# Patient Record
Sex: FEMALE | Race: WHITE | NOT HISPANIC OR LATINO | Employment: UNEMPLOYED | URBAN - METROPOLITAN AREA
[De-identification: names, ages, dates, MRNs, and addresses within clinical notes are randomized per-mention and may not be internally consistent; named-entity substitution may affect disease eponyms.]

---

## 2017-11-14 PROBLEM — O28.1: Status: ACTIVE | Noted: 2017-11-14

## 2017-11-14 PROBLEM — O28.0 ABNORMAL MSAFP (MATERNAL SERUM ALPHA-FETOPROTEIN), ELEVATED: Status: ACTIVE | Noted: 2017-11-14

## 2018-02-13 PROBLEM — O28.0 ABNORMAL QUAD SCREEN: Status: ACTIVE | Noted: 2018-02-13

## 2018-02-16 PROBLEM — Z87.898 HISTORY OF POOR FETAL GROWTH: Status: ACTIVE | Noted: 2018-02-16

## 2018-02-23 PROBLEM — O36.5990 ASYMMETRIC INTRAUTERINE GROWTH RESTRICTION AFFECTING PREGNANCY, ANTEPARTUM: Status: ACTIVE | Noted: 2018-02-23

## 2018-02-26 PROBLEM — O28.8 NST (NON-STRESS TEST) NONREACTIVE: Status: ACTIVE | Noted: 2018-02-26

## 2018-03-02 PROBLEM — O36.5990 INTRAUTERINE GROWTH RESTRICTION (IUGR) AFFECTING CARE OF MOTHER: Status: ACTIVE | Noted: 2018-03-02

## 2018-03-26 PROBLEM — O36.5990 INTRAUTERINE GROWTH RESTRICTION, ANTEPARTUM: Status: ACTIVE | Noted: 2018-03-26

## 2018-03-26 PROBLEM — O99.013 ANEMIA AFFECTING PREGNANCY IN THIRD TRIMESTER: Status: ACTIVE | Noted: 2018-03-26

## 2018-03-26 PROBLEM — O99.820 GROUP B STREPTOCOCCAL CARRIAGE COMPLICATING PREGNANCY: Status: ACTIVE | Noted: 2018-03-26

## 2019-01-21 PROBLEM — O28.0 ABNORMAL QUAD SCREEN: Status: RESOLVED | Noted: 2018-02-13 | Resolved: 2019-01-21

## 2019-03-04 PROBLEM — O28.0 ABNORMAL MSAFP (MATERNAL SERUM ALPHA-FETOPROTEIN), ELEVATED: Status: RESOLVED | Noted: 2017-11-14 | Resolved: 2019-03-04

## 2019-09-21 ENCOUNTER — OFFICE VISIT (OUTPATIENT)
Dept: URGENT CARE | Facility: CLINIC | Age: 29
End: 2019-09-21
Payer: COMMERCIAL

## 2019-09-21 VITALS
WEIGHT: 110 LBS | RESPIRATION RATE: 18 BRPM | BODY MASS INDEX: 19.49 KG/M2 | DIASTOLIC BLOOD PRESSURE: 70 MMHG | HEART RATE: 100 BPM | OXYGEN SATURATION: 100 % | HEIGHT: 63 IN | SYSTOLIC BLOOD PRESSURE: 101 MMHG | TEMPERATURE: 99 F

## 2019-09-21 DIAGNOSIS — A08.4 VIRAL GASTROENTERITIS: Primary | ICD-10-CM

## 2019-09-21 PROCEDURE — 3008F PR BODY MASS INDEX (BMI) DOCUMENTED: ICD-10-PCS | Mod: CPTII,S$GLB,, | Performed by: PHYSICIAN ASSISTANT

## 2019-09-21 PROCEDURE — S0119 PR ONDANSETRON, ORAL, 4MG: ICD-10-PCS | Mod: S$GLB,,, | Performed by: PHYSICIAN ASSISTANT

## 2019-09-21 PROCEDURE — 99203 OFFICE O/P NEW LOW 30 MIN: CPT | Mod: S$GLB,,, | Performed by: PHYSICIAN ASSISTANT

## 2019-09-21 PROCEDURE — 3008F BODY MASS INDEX DOCD: CPT | Mod: CPTII,S$GLB,, | Performed by: PHYSICIAN ASSISTANT

## 2019-09-21 PROCEDURE — 99203 PR OFFICE/OUTPT VISIT, NEW, LEVL III, 30-44 MIN: ICD-10-PCS | Mod: S$GLB,,, | Performed by: PHYSICIAN ASSISTANT

## 2019-09-21 PROCEDURE — S0119 ONDANSETRON 4 MG: HCPCS | Mod: S$GLB,,, | Performed by: PHYSICIAN ASSISTANT

## 2019-09-21 RX ORDER — ONDANSETRON 4 MG/1
4 TABLET, ORALLY DISINTEGRATING ORAL
Status: COMPLETED | OUTPATIENT
Start: 2019-09-21 | End: 2019-09-21

## 2019-09-21 RX ORDER — ONDANSETRON 4 MG/1
4 TABLET, FILM COATED ORAL EVERY 8 HOURS PRN
Qty: 30 TABLET | Refills: 0 | Status: ON HOLD | OUTPATIENT
Start: 2019-09-21 | End: 2020-03-09 | Stop reason: CLARIF

## 2019-09-21 RX ADMIN — ONDANSETRON 4 MG: 4 TABLET, ORALLY DISINTEGRATING ORAL at 11:09

## 2019-09-21 NOTE — PROGRESS NOTES
"Subjective:       Patient ID: Fiona Timmons is a 29 y.o. female.    Vitals:  height is 5' 3" (1.6 m) and weight is 49.9 kg (110 lb). Her oral temperature is 98.5 °F (36.9 °C). Her blood pressure is 101/70 and her pulse is 100. Her respiration is 18 and oxygen saturation is 100%.     Chief Complaint: Nausea    Patient woke @  2 am with nausea and 3 am started vomiting . Patient is consistently vomited every 2 hours since then. Patient's daughter had a stomach virus this past Wednesday and patient is 13 weeks pregnant.    Nausea   This is a new problem. The current episode started today. The problem occurs constantly. The problem has been gradually worsening. Associated symptoms include headaches, nausea and vomiting. Pertinent negatives include no abdominal pain, chills, fever or rash.       Constitution: Negative for chills and fever.   Neck: Negative for painful lymph nodes.   Gastrointestinal: Positive for nausea and vomiting. Negative for abdominal pain, constipation and diarrhea.   Genitourinary: Positive for pelvic pain. Negative for dysuria, frequency, urgency, urine decreased, hematuria, history of kidney stones, painful menstruation, heavy menstrual bleeding, ovarian cysts, genital trauma, vaginal pain, vaginal discharge, vaginal bleeding, vaginal odor, painful intercourse, genital sore and painful ejaculation.   Musculoskeletal: Positive for back pain.   Skin: Negative for rash and lesion.   Neurological: Positive for headaches.   Hematologic/Lymphatic: Negative for swollen lymph nodes.       Objective:      Physical Exam   Constitutional: She is oriented to person, place, and time. She appears well-developed and well-nourished.   HENT:   Head: Normocephalic and atraumatic.   Right Ear: External ear normal.   Left Ear: External ear normal.   Nose: Nose normal.   Mouth/Throat: Mucous membranes are normal.   Eyes: Conjunctivae and lids are normal.   Neck: Trachea normal and full passive range of motion " without pain. Neck supple.   Cardiovascular: Normal rate, regular rhythm and normal heart sounds.   Pulmonary/Chest: Effort normal and breath sounds normal. No respiratory distress.   Abdominal: Soft. Normal appearance and bowel sounds are normal. She exhibits no distension, no abdominal bruit, no pulsatile midline mass and no mass. There is tenderness (Mild) in the suprapubic area.   Musculoskeletal: Normal range of motion. She exhibits no edema.   Neurological: She is alert and oriented to person, place, and time. She has normal strength.   Skin: Skin is warm, dry and intact. She is not diaphoretic. No pallor.   Psychiatric: She has a normal mood and affect. Her speech is normal and behavior is normal. Judgment and thought content normal. Cognition and memory are normal.   Nursing note and vitals reviewed.      Assessment:       1. Viral gastroenteritis        Plan:         Viral gastroenteritis    Other orders  -     ondansetron disintegrating tablet 4 mg  -     ondansetron (ZOFRAN) 4 MG tablet; Take 1 tablet (4 mg total) by mouth every 8 (eight) hours as needed.  Dispense: 30 tablet; Refill: 0     Viral gastroenteritis.  Likely the same.  She denies any vaginal bleeding.  Denies any hemoptysis.  Will treat with Zofran.  Given strict precautions to go to ED with any worsening pain, vaginal bleeding or no response to Zofran.  Advised low residue diet and then progressed diet.  Advised water and Pedialyte for rehydration.  Patient states understanding and agrees with plan.      You must understand that you've received an Urgent Care treatment only and that you may be released before all your medical problems are known or treated. You, the patient, will arrange for follow up care as instructed.  Follow up with your PCP or specialty clinic as directed in the next 1-2 weeks if not improved or as needed.  You can call [phone] to schedule an appointment with the appropriate provider.  If your condition worsens we  recommend that you receive another evaluation at the emergency room immediately or contact your primary medical clinics after hours call service to discuss your concerns.  Please return here or go to the Emergency Department for any concerns or worsening of condition.

## 2019-09-21 NOTE — PROGRESS NOTES
Subjective:       Patient ID: Fiona Timmons is a 29 y.o. female.    Vitals:  vitals were not taken for this visit.     Chief Complaint: Nausea    Nausea   Associated symptoms include nausea.       Gastrointestinal: Positive for nausea.       Objective:      Physical Exam    Assessment:       No diagnosis found.    Plan:         There are no diagnoses linked to this encounter.

## 2019-09-21 NOTE — PATIENT INSTRUCTIONS

## 2019-09-24 ENCOUNTER — TELEPHONE (OUTPATIENT)
Dept: URGENT CARE | Facility: CLINIC | Age: 29
End: 2019-09-24

## 2020-02-06 PROBLEM — O21.9 NAUSEA AND VOMITING IN PREGNANCY: Status: ACTIVE | Noted: 2020-02-06

## 2020-03-09 PROBLEM — Z34.90 ENCOUNTER FOR INDUCTION OF LABOR: Status: ACTIVE | Noted: 2020-03-09

## 2020-10-08 ENCOUNTER — TELEPHONE (OUTPATIENT)
Dept: ADMINISTRATIVE | Facility: CLINIC | Age: 30
End: 2020-10-08

## 2021-04-28 ENCOUNTER — OFFICE VISIT (OUTPATIENT)
Dept: URGENT CARE | Facility: CLINIC | Age: 31
End: 2021-04-28
Payer: COMMERCIAL

## 2021-04-28 VITALS
BODY MASS INDEX: 23.74 KG/M2 | WEIGHT: 134 LBS | SYSTOLIC BLOOD PRESSURE: 111 MMHG | TEMPERATURE: 99 F | HEIGHT: 63 IN | DIASTOLIC BLOOD PRESSURE: 73 MMHG | HEART RATE: 97 BPM | OXYGEN SATURATION: 100 % | RESPIRATION RATE: 18 BRPM

## 2021-04-28 DIAGNOSIS — R50.9 FEVER, UNSPECIFIED FEVER CAUSE: Primary | ICD-10-CM

## 2021-04-28 DIAGNOSIS — N61.0 MASTITIS: ICD-10-CM

## 2021-04-28 DIAGNOSIS — R39.15 URINARY URGENCY: ICD-10-CM

## 2021-04-28 DIAGNOSIS — R30.0 DYSURIA: ICD-10-CM

## 2021-04-28 LAB
BILIRUB UR QL STRIP: NEGATIVE
GLUCOSE UR QL STRIP: NEGATIVE
KETONES UR QL STRIP: NEGATIVE
LEUKOCYTE ESTERASE UR QL STRIP: NEGATIVE
PH, POC UA: 5 (ref 5–8)
POC BLOOD, URINE: NEGATIVE
POC NITRATES, URINE: NEGATIVE
PROT UR QL STRIP: NEGATIVE
SP GR UR STRIP: 1 (ref 1–1.03)
UROBILINOGEN UR STRIP-ACNC: NORMAL (ref 0.1–1.1)

## 2021-04-28 PROCEDURE — 99214 OFFICE O/P EST MOD 30 MIN: CPT | Mod: 25,S$GLB,, | Performed by: PHYSICIAN ASSISTANT

## 2021-04-28 PROCEDURE — 1125F AMNT PAIN NOTED PAIN PRSNT: CPT | Mod: S$GLB,,, | Performed by: PHYSICIAN ASSISTANT

## 2021-04-28 PROCEDURE — 81003 POCT URINALYSIS, DIPSTICK, AUTOMATED, W/O SCOPE: ICD-10-PCS | Mod: QW,S$GLB,, | Performed by: PHYSICIAN ASSISTANT

## 2021-04-28 PROCEDURE — 87086 CULTURE, URINE: ICD-10-PCS | Mod: S$GLB,,, | Performed by: PHYSICIAN ASSISTANT

## 2021-04-28 PROCEDURE — 3008F PR BODY MASS INDEX (BMI) DOCUMENTED: ICD-10-PCS | Mod: CPTII,S$GLB,, | Performed by: PHYSICIAN ASSISTANT

## 2021-04-28 PROCEDURE — 81003 URINALYSIS AUTO W/O SCOPE: CPT | Mod: QW,S$GLB,, | Performed by: PHYSICIAN ASSISTANT

## 2021-04-28 PROCEDURE — 87086 URINE CULTURE/COLONY COUNT: CPT | Mod: S$GLB,,, | Performed by: PHYSICIAN ASSISTANT

## 2021-04-28 PROCEDURE — 1125F PR PAIN SEVERITY QUANTIFIED, PAIN PRESENT: ICD-10-PCS | Mod: S$GLB,,, | Performed by: PHYSICIAN ASSISTANT

## 2021-04-28 PROCEDURE — 99214 PR OFFICE/OUTPT VISIT, EST, LEVL IV, 30-39 MIN: ICD-10-PCS | Mod: 25,S$GLB,, | Performed by: PHYSICIAN ASSISTANT

## 2021-04-28 PROCEDURE — 3008F BODY MASS INDEX DOCD: CPT | Mod: CPTII,S$GLB,, | Performed by: PHYSICIAN ASSISTANT

## 2021-04-28 RX ORDER — CEPHALEXIN 500 MG/1
500 CAPSULE ORAL 4 TIMES DAILY
Qty: 28 CAPSULE | Refills: 0 | Status: SHIPPED | OUTPATIENT
Start: 2021-04-28 | End: 2021-05-03

## 2021-05-02 ENCOUNTER — TELEPHONE (OUTPATIENT)
Dept: URGENT CARE | Facility: CLINIC | Age: 31
End: 2021-05-02

## 2021-05-02 LAB
BACTERIA UR CULT: NORMAL
BACTERIA UR CULT: NORMAL

## 2021-05-03 ENCOUNTER — OFFICE VISIT (OUTPATIENT)
Dept: FAMILY MEDICINE | Facility: CLINIC | Age: 31
End: 2021-05-03
Payer: COMMERCIAL

## 2021-05-03 VITALS
OXYGEN SATURATION: 99 % | SYSTOLIC BLOOD PRESSURE: 112 MMHG | BODY MASS INDEX: 20.43 KG/M2 | DIASTOLIC BLOOD PRESSURE: 78 MMHG | HEART RATE: 113 BPM | HEIGHT: 63 IN | WEIGHT: 115.31 LBS

## 2021-05-03 DIAGNOSIS — G43.119 INTRACTABLE MIGRAINE WITH AURA WITHOUT STATUS MIGRAINOSUS: Primary | ICD-10-CM

## 2021-05-03 PROBLEM — Z34.90 ENCOUNTER FOR INDUCTION OF LABOR: Status: RESOLVED | Noted: 2020-03-09 | Resolved: 2021-05-03

## 2021-05-03 PROCEDURE — 99204 PR OFFICE/OUTPT VISIT, NEW, LEVL IV, 45-59 MIN: ICD-10-PCS | Mod: S$GLB,,, | Performed by: PHYSICIAN ASSISTANT

## 2021-05-03 PROCEDURE — 99999 PR PBB SHADOW E&M-EST. PATIENT-LVL III: CPT | Mod: PBBFAC,,, | Performed by: PHYSICIAN ASSISTANT

## 2021-05-03 PROCEDURE — 99204 OFFICE O/P NEW MOD 45 MIN: CPT | Mod: S$GLB,,, | Performed by: PHYSICIAN ASSISTANT

## 2021-05-03 PROCEDURE — 99999 PR PBB SHADOW E&M-EST. PATIENT-LVL III: ICD-10-PCS | Mod: PBBFAC,,, | Performed by: PHYSICIAN ASSISTANT

## 2022-05-22 ENCOUNTER — OFFICE VISIT (OUTPATIENT)
Dept: URGENT CARE | Facility: CLINIC | Age: 32
End: 2022-05-22
Payer: COMMERCIAL

## 2022-05-22 VITALS
RESPIRATION RATE: 19 BRPM | DIASTOLIC BLOOD PRESSURE: 68 MMHG | TEMPERATURE: 98 F | HEART RATE: 83 BPM | BODY MASS INDEX: 20.38 KG/M2 | HEIGHT: 63 IN | SYSTOLIC BLOOD PRESSURE: 104 MMHG | WEIGHT: 115 LBS | OXYGEN SATURATION: 96 %

## 2022-05-22 DIAGNOSIS — L30.9 ECZEMA, UNSPECIFIED TYPE: ICD-10-CM

## 2022-05-22 DIAGNOSIS — R11.2 NAUSEA AND VOMITING, INTRACTABILITY OF VOMITING NOT SPECIFIED, UNSPECIFIED VOMITING TYPE: ICD-10-CM

## 2022-05-22 DIAGNOSIS — R51.9 NONINTRACTABLE HEADACHE, UNSPECIFIED CHRONICITY PATTERN, UNSPECIFIED HEADACHE TYPE: Primary | ICD-10-CM

## 2022-05-22 PROCEDURE — 1159F MED LIST DOCD IN RCRD: CPT | Mod: CPTII,S$GLB,, | Performed by: PHYSICIAN ASSISTANT

## 2022-05-22 PROCEDURE — 1160F PR REVIEW ALL MEDS BY PRESCRIBER/CLIN PHARMACIST DOCUMENTED: ICD-10-PCS | Mod: CPTII,S$GLB,, | Performed by: PHYSICIAN ASSISTANT

## 2022-05-22 PROCEDURE — S0119 PR ONDANSETRON, ORAL, 4MG: ICD-10-PCS | Mod: S$GLB,,, | Performed by: PHYSICIAN ASSISTANT

## 2022-05-22 PROCEDURE — 99213 OFFICE O/P EST LOW 20 MIN: CPT | Mod: S$GLB,,, | Performed by: PHYSICIAN ASSISTANT

## 2022-05-22 PROCEDURE — 3008F PR BODY MASS INDEX (BMI) DOCUMENTED: ICD-10-PCS | Mod: CPTII,S$GLB,, | Performed by: PHYSICIAN ASSISTANT

## 2022-05-22 PROCEDURE — 1159F PR MEDICATION LIST DOCUMENTED IN MEDICAL RECORD: ICD-10-PCS | Mod: CPTII,S$GLB,, | Performed by: PHYSICIAN ASSISTANT

## 2022-05-22 PROCEDURE — 3074F PR MOST RECENT SYSTOLIC BLOOD PRESSURE < 130 MM HG: ICD-10-PCS | Mod: CPTII,S$GLB,, | Performed by: PHYSICIAN ASSISTANT

## 2022-05-22 PROCEDURE — 3078F DIAST BP <80 MM HG: CPT | Mod: CPTII,S$GLB,, | Performed by: PHYSICIAN ASSISTANT

## 2022-05-22 PROCEDURE — 3008F BODY MASS INDEX DOCD: CPT | Mod: CPTII,S$GLB,, | Performed by: PHYSICIAN ASSISTANT

## 2022-05-22 PROCEDURE — 99213 PR OFFICE/OUTPT VISIT, EST, LEVL III, 20-29 MIN: ICD-10-PCS | Mod: S$GLB,,, | Performed by: PHYSICIAN ASSISTANT

## 2022-05-22 PROCEDURE — S0119 ONDANSETRON 4 MG: HCPCS | Mod: S$GLB,,, | Performed by: PHYSICIAN ASSISTANT

## 2022-05-22 PROCEDURE — 3078F PR MOST RECENT DIASTOLIC BLOOD PRESSURE < 80 MM HG: ICD-10-PCS | Mod: CPTII,S$GLB,, | Performed by: PHYSICIAN ASSISTANT

## 2022-05-22 PROCEDURE — 3074F SYST BP LT 130 MM HG: CPT | Mod: CPTII,S$GLB,, | Performed by: PHYSICIAN ASSISTANT

## 2022-05-22 PROCEDURE — 1160F RVW MEDS BY RX/DR IN RCRD: CPT | Mod: CPTII,S$GLB,, | Performed by: PHYSICIAN ASSISTANT

## 2022-05-22 RX ORDER — TRIAMCINOLONE ACETONIDE 5 MG/G
CREAM TOPICAL 2 TIMES DAILY
Qty: 454 G | Refills: 0 | Status: SHIPPED | OUTPATIENT
Start: 2022-05-22 | End: 2022-06-14

## 2022-05-22 RX ORDER — ONDANSETRON 4 MG/1
4 TABLET, ORALLY DISINTEGRATING ORAL EVERY 8 HOURS PRN
Qty: 12 TABLET | Refills: 0 | Status: SHIPPED | OUTPATIENT
Start: 2022-05-22

## 2022-05-22 RX ORDER — PREDNISONE 20 MG/1
TABLET ORAL
Qty: 12 TABLET | Refills: 0 | Status: SHIPPED | OUTPATIENT
Start: 2022-05-22 | End: 2022-05-28

## 2022-05-22 RX ORDER — ELETRIPTAN HYDROBROMIDE 40 MG/1
40 TABLET, FILM COATED ORAL
Qty: 12 TABLET | Refills: 0 | Status: SHIPPED | OUTPATIENT
Start: 2022-05-22 | End: 2022-06-14

## 2022-05-22 RX ORDER — ONDANSETRON 4 MG/1
4 TABLET, ORALLY DISINTEGRATING ORAL
Status: COMPLETED | OUTPATIENT
Start: 2022-05-22 | End: 2022-05-22

## 2022-05-22 RX ADMIN — ONDANSETRON 4 MG: 4 TABLET, ORALLY DISINTEGRATING ORAL at 10:05

## 2022-05-22 NOTE — PATIENT INSTRUCTIONS
You will receive a call from [facility] in the next 2-3 days regarding the scheduling of the Referral that was placed for you today.  Should you not receive a call, you may call the patient  at [phone] for appointments on the [facility] or [phone] for the [facility]  scheduling office.      Please follow up with your Primary care provider within 2-5 days if your signs and symptoms have not resolved or worsen.     If your condition worsens or fails to improve we recommend that you receive another evaluation at the emergency room immediately or contact your primary medical clinic to discuss your concerns.   You must understand that you have received an Urgent Care treatment only and that you may be released before all of your medical problems are known or treated. You, the patient, will arrange for follow up care as instructed.     RED FLAGS/WARNING SYMPTOMS DISCUSSED WITH PATIENT THAT WOULD WARRANT EMERGENT MEDICAL ATTENTION. PATIENT VERBALIZED UNDERSTANDING.

## 2022-05-22 NOTE — PROGRESS NOTES
"Subjective:       Patient ID: Fiona Timmons is a 32 y.o. female.    Vitals:  height is 5' 3" (1.6 m) and weight is 52.2 kg (115 lb). Her temperature is 98.3 °F (36.8 °C). Her blood pressure is 104/68 and her pulse is 83. Her respiration is 19 and oxygen saturation is 96%.     Chief Complaint: Migraine    Pt presents to urgent care for evaluation of a migraine, eczema on her bilateral eyelids and neck, as well as, nausea/vomiting x 4 days.  She has been taking 600 mg of ibuprofen with no relief.  Her last dose was around 6 AM this morning.  She is COVID vaccinated reports no known exposures.  Pain scale is 9/10.  She was last evaluated for migraine 1 year ago.  Her current migraine is located in her left frontal and temporal regions.  She also reports photophobia, intermittent blurred vision, and scotomas.    Migraine   This is a recurrent problem. The current episode started in the past 7 days. The problem occurs constantly. The problem has been gradually worsening. The pain is located in the frontal and left unilateral region. The pain radiates to the upper back. The pain quality is similar to prior headaches. The quality of the pain is described as throbbing. The pain is at a severity of 9/10. The pain is severe. Associated symptoms include blurred vision, eye pain, nausea, neck pain, photophobia and vomiting. Pertinent negatives include no abdominal pain, coughing, fever or sore throat. The symptoms are aggravated by bright light. She has tried NSAIDs for the symptoms. The treatment provided no relief. Her past medical history is significant for migraine headaches.       Constitution: Negative for chills and fever.   HENT: Negative for congestion and sore throat.    Neck: Positive for neck pain.   Cardiovascular: Negative for chest pain.   Eyes: Positive for eye pain, photophobia and blurred vision.   Respiratory: Negative for cough and shortness of breath.    Gastrointestinal: Positive for nausea and vomiting. " Negative for abdominal pain and diarrhea.   Musculoskeletal: Negative for muscle ache.   Skin: Positive for erythema. Negative for rash.   Neurological: Positive for history of migraines.       Objective:      Physical Exam   Constitutional: She is oriented to person, place, and time.  Non-toxic appearance. She does not appear ill. No distress.   HENT:   Head: Normocephalic and atraumatic.   Eyes: Conjunctivae are normal. Right eye exhibits no discharge. Left eye exhibits no discharge. No scleral icterus.   Cardiovascular: Normal rate, regular rhythm and normal heart sounds.   No murmur heard.Exam reveals no gallop and no friction rub.   Pulmonary/Chest: Breath sounds normal. No stridor. No respiratory distress. She has no wheezes. She has no rhonchi. She has no rales.   Abdominal: Normal appearance. There is no abdominal tenderness. There is no rebound, no guarding, no tenderness at McBurney's point and negative Palafox's sign.      Comments: Abdomen is scaphoid without any ecchymosis, erythema or lesions. Abdomen is soft to palpation without any distention or crepitus.  No organomegaly noted. No tenderness to palpation in all 4 quadrants.  No guarding or acute abdomen.  No CVA tenderness bilaterally.     Neurological: She is alert and oriented to person, place, and time. She has normal motor skills, normal sensation and intact cranial nerves. She displays no weakness, no atrophy, facial symmetry and no dysarthria. No cranial nerve deficit. Gait and coordination normal.      Comments: Alert, oriented x 3. EOMI, PERRLA. Cranial nerves intact: facial expressions (smile, raising eyebrows, shutting eyes, pursed lips) symmetric. Shoulder shrug strength 5/5; sternocleidomastoid muscle strength 5/5 bilaterally. Jaw is midline without deviation. Tongue protrudes at midline without fasciculations. Sensation to face in distribution of CN V1, V2, and V3 intact. Sensation to upper and lower extremities intact. Finger to nose,  rapid rhythmic alternating movements, and heel to shin test are intact and smooth bilaterally. Patient ambulates unassisted without rigidity or ataxia. Romberg negative. Voice quality, comprehension, articulation, coherence assessed as appropriate.      Skin: Skin is not diaphoretic and rash. erythema              Assessment:       1. Nonintractable headache, unspecified chronicity pattern, unspecified headache type    2. Eczema, unspecified type    3. Nausea and vomiting, intractability of vomiting not specified, unspecified vomiting type        Plan:     Zofran provided at time of visit to help settle her stomach and called in for home use.  No abnormalities noted on abdominal exam at this time.  Her migraine is located on the left side of her head in her temporal and frontal regions.  Prednisone prescribed to reduce inflammation and Relpax called in for any future episodes.  Referral to Neurology placed at this time.  She states that she gets at least 1 migraine a month.  Topical triamcinolone cream prescribed for eczema however discussed not to place this cream on the face.  Prednisone can also help with the eczema flare.  No neuro deficits. Patient verbalized understanding and all of their questions were answered.       Nonintractable headache, unspecified chronicity pattern, unspecified headache type  -     predniSONE (DELTASONE) 20 MG tablet; Take 1 tablet (20 mg total) by mouth 3 (three) times daily for 2 days, THEN 1 tablet (20 mg total) 2 (two) times daily for 2 days, THEN 1 tablet (20 mg total) once daily for 2 days.  Dispense: 12 tablet; Refill: 0  -     eletriptan (RELPAX) 40 MG tablet; Take 1 tablet (40 mg total) by mouth as needed (migraine). may repeat in 2 hours if necessary  Dispense: 12 tablet; Refill: 0  -     Ambulatory referral/consult to Neurology    Eczema, unspecified type  -     triamcinolone acetonide 0.5% (KENALOG) 0.5 % Crea; Apply topically 2 (two) times daily. for 10 days  Dispense:  454 g; Refill: 0    Nausea and vomiting, intractability of vomiting not specified, unspecified vomiting type  -     ondansetron (ZOFRAN-ODT) 4 MG TbDL; Take 1 tablet (4 mg total) by mouth every 8 (eight) hours as needed (nausea).  Dispense: 12 tablet; Refill: 0  -     ondansetron disintegrating tablet 4 mg      Patient Instructions   You will receive a call from [facility] in the next 2-3 days regarding the scheduling of the Referral that was placed for you today.  Should you not receive a call, you may call the patient  at [phone] for appointments on the [facility] or [phone] for the [facility]  scheduling office.      Please follow up with your Primary care provider within 2-5 days if your signs and symptoms have not resolved or worsen.     If your condition worsens or fails to improve we recommend that you receive another evaluation at the emergency room immediately or contact your primary medical clinic to discuss your concerns.   You must understand that you have received an Urgent Care treatment only and that you may be released before all of your medical problems are known or treated. You, the patient, will arrange for follow up care as instructed.     RED FLAGS/WARNING SYMPTOMS DISCUSSED WITH PATIENT THAT WOULD WARRANT EMERGENT MEDICAL ATTENTION. PATIENT VERBALIZED UNDERSTANDING.

## 2022-06-14 ENCOUNTER — OFFICE VISIT (OUTPATIENT)
Dept: NEUROLOGY | Facility: CLINIC | Age: 32
End: 2022-06-14
Payer: COMMERCIAL

## 2022-06-14 VITALS
RESPIRATION RATE: 17 BRPM | WEIGHT: 111.75 LBS | HEIGHT: 63 IN | DIASTOLIC BLOOD PRESSURE: 71 MMHG | HEART RATE: 88 BPM | BODY MASS INDEX: 19.8 KG/M2 | SYSTOLIC BLOOD PRESSURE: 103 MMHG

## 2022-06-14 DIAGNOSIS — R11.0 NAUSEA: ICD-10-CM

## 2022-06-14 DIAGNOSIS — M79.18 CERVICAL MYOFASCIAL PAIN SYNDROME: ICD-10-CM

## 2022-06-14 DIAGNOSIS — G43.109 MIGRAINE WITH AURA AND WITHOUT STATUS MIGRAINOSUS, NOT INTRACTABLE: Primary | ICD-10-CM

## 2022-06-14 PROBLEM — O99.013 ANEMIA AFFECTING PREGNANCY IN THIRD TRIMESTER: Status: RESOLVED | Noted: 2018-03-26 | Resolved: 2022-06-14

## 2022-06-14 PROBLEM — Z87.898 HISTORY OF POOR FETAL GROWTH: Status: RESOLVED | Noted: 2018-02-16 | Resolved: 2022-06-14

## 2022-06-14 PROBLEM — O36.5990 ASYMMETRIC INTRAUTERINE GROWTH RESTRICTION AFFECTING PREGNANCY, ANTEPARTUM: Status: RESOLVED | Noted: 2018-02-23 | Resolved: 2022-06-14

## 2022-06-14 PROBLEM — O36.5990 IUGR (INTRAUTERINE GROWTH RESTRICTION) AFFECTING CARE OF MOTHER: Status: RESOLVED | Noted: 2018-03-02 | Resolved: 2022-06-14

## 2022-06-14 PROBLEM — O21.9 NAUSEA AND VOMITING IN PREGNANCY: Status: RESOLVED | Noted: 2020-02-06 | Resolved: 2022-06-14

## 2022-06-14 PROBLEM — O36.5990 INTRAUTERINE GROWTH RESTRICTION, ANTEPARTUM: Status: RESOLVED | Noted: 2018-03-26 | Resolved: 2022-06-14

## 2022-06-14 PROBLEM — O28.8 NST (NON-STRESS TEST) NONREACTIVE: Status: RESOLVED | Noted: 2018-02-26 | Resolved: 2022-06-14

## 2022-06-14 PROBLEM — O99.820 GROUP B STREPTOCOCCAL CARRIAGE COMPLICATING PREGNANCY: Status: RESOLVED | Noted: 2018-03-26 | Resolved: 2022-06-14

## 2022-06-14 PROCEDURE — 3008F BODY MASS INDEX DOCD: CPT | Mod: CPTII,S$GLB,, | Performed by: NURSE PRACTITIONER

## 2022-06-14 PROCEDURE — 99205 OFFICE O/P NEW HI 60 MIN: CPT | Mod: 25,S$GLB,, | Performed by: NURSE PRACTITIONER

## 2022-06-14 PROCEDURE — 1160F PR REVIEW ALL MEDS BY PRESCRIBER/CLIN PHARMACIST DOCUMENTED: ICD-10-PCS | Mod: CPTII,S$GLB,, | Performed by: NURSE PRACTITIONER

## 2022-06-14 PROCEDURE — 99205 PR OFFICE/OUTPT VISIT, NEW, LEVL V, 60-74 MIN: ICD-10-PCS | Mod: 25,S$GLB,, | Performed by: NURSE PRACTITIONER

## 2022-06-14 PROCEDURE — 1159F PR MEDICATION LIST DOCUMENTED IN MEDICAL RECORD: ICD-10-PCS | Mod: CPTII,S$GLB,, | Performed by: NURSE PRACTITIONER

## 2022-06-14 PROCEDURE — 96372 THER/PROPH/DIAG INJ SC/IM: CPT | Mod: S$GLB,,, | Performed by: NURSE PRACTITIONER

## 2022-06-14 PROCEDURE — 3008F PR BODY MASS INDEX (BMI) DOCUMENTED: ICD-10-PCS | Mod: CPTII,S$GLB,, | Performed by: NURSE PRACTITIONER

## 2022-06-14 PROCEDURE — 3078F DIAST BP <80 MM HG: CPT | Mod: CPTII,S$GLB,, | Performed by: NURSE PRACTITIONER

## 2022-06-14 PROCEDURE — 3074F PR MOST RECENT SYSTOLIC BLOOD PRESSURE < 130 MM HG: ICD-10-PCS | Mod: CPTII,S$GLB,, | Performed by: NURSE PRACTITIONER

## 2022-06-14 PROCEDURE — 96372 PR INJECTION,THERAP/PROPH/DIAG2ST, IM OR SUBCUT: ICD-10-PCS | Mod: S$GLB,,, | Performed by: NURSE PRACTITIONER

## 2022-06-14 PROCEDURE — 1159F MED LIST DOCD IN RCRD: CPT | Mod: CPTII,S$GLB,, | Performed by: NURSE PRACTITIONER

## 2022-06-14 PROCEDURE — 99999 PR PBB SHADOW E&M-EST. PATIENT-LVL IV: CPT | Mod: PBBFAC,,, | Performed by: NURSE PRACTITIONER

## 2022-06-14 PROCEDURE — 3078F PR MOST RECENT DIASTOLIC BLOOD PRESSURE < 80 MM HG: ICD-10-PCS | Mod: CPTII,S$GLB,, | Performed by: NURSE PRACTITIONER

## 2022-06-14 PROCEDURE — 1160F RVW MEDS BY RX/DR IN RCRD: CPT | Mod: CPTII,S$GLB,, | Performed by: NURSE PRACTITIONER

## 2022-06-14 PROCEDURE — 99999 PR PBB SHADOW E&M-EST. PATIENT-LVL IV: ICD-10-PCS | Mod: PBBFAC,,, | Performed by: NURSE PRACTITIONER

## 2022-06-14 PROCEDURE — 3074F SYST BP LT 130 MM HG: CPT | Mod: CPTII,S$GLB,, | Performed by: NURSE PRACTITIONER

## 2022-06-14 RX ORDER — TIZANIDINE 4 MG/1
TABLET ORAL
Qty: 30 TABLET | Refills: 11 | Status: SHIPPED | OUTPATIENT
Start: 2022-06-14

## 2022-06-14 RX ORDER — KETOROLAC TROMETHAMINE 30 MG/ML
30 INJECTION, SOLUTION INTRAMUSCULAR; INTRAVENOUS
Status: COMPLETED | OUTPATIENT
Start: 2022-06-14 | End: 2022-06-14

## 2022-06-14 RX ORDER — PROCHLORPERAZINE MALEATE 10 MG
10 TABLET ORAL EVERY 6 HOURS PRN
Qty: 60 TABLET | Refills: 11 | Status: SHIPPED | OUTPATIENT
Start: 2022-06-14

## 2022-06-14 RX ORDER — ELETRIPTAN HYDROBROMIDE 40 MG/1
TABLET, FILM COATED ORAL
Qty: 10 TABLET | Refills: 11 | Status: SHIPPED | OUTPATIENT
Start: 2022-06-14 | End: 2022-07-14 | Stop reason: ALTCHOICE

## 2022-06-14 RX ADMIN — KETOROLAC TROMETHAMINE 30 MG: 30 INJECTION, SOLUTION INTRAMUSCULAR; INTRAVENOUS at 10:06

## 2022-06-14 NOTE — PATIENT INSTRUCTIONS
Please call our clinic at [phone] or send a message on the [facility] portal if there are any changes to the plan described below, for example,if you are not contacted for the requested tests, referral(s) within one week, if you are unable to receive the medications prescribed, or if you feel you need to change the treatment course for any reason.     TESTING:  -- none at this time    REFERRALS:  -- physical therapy    PREVENTION (use daily regardless of headache):  -- START Migravent supplement or magnesium alone  magnesium in ONE of the following preparations -               1. Magnesium oxide 800mg daily (the most common over the counter kind, may causes loose stools)              2. Magnesium citrate 400-500mg daily (harder to find, but more neutral on the bowels)              3. Magnesium glycinate 400mg daily (hardest to find, look online, but most bowel-neutral, best absorbed)     AS-NEEDED TREATMENT (use total no more than 10 days per month unless otherwise stated):  -- ok to continue eletriptan as needed for acute attacks  -- START tizanidine at night (TAKE AFTER YOU BREASTFEED). This is a muscle relaxer and it will also make you potentially sleepy. Start with half a tablet to see how you respond but can take up to a whole tablet if needed  -- start compazine. This is a nausea medication that also has anti-migraine properties. Can take daily and will not contribute to rebound headaches

## 2022-06-14 NOTE — PROGRESS NOTES
Date of service: 6/14/2022  Referring provider: Shan Cedeno    Subjective:      Chief complaint: Headache       Patient ID: Fiona Timmons is a 32 y.o. female who presents for patient evaluation of headache     History of Present Illness    ORIGINAL HEADACHE HISTORY - 6/14/22   Age at onset and course over time: four years ago with worsening in past 2 years  She previously would get one migraine per month but in the past few months she is getting at least two migraines per month which are lasting 2-3 days each.    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    Aura - floaters in vision, usually unilateral, lasts an hour, always accompanied by severe migraine    Family history - mother had migraines  Last eye exam - 2021, scheduled this month as well    Location: neck, over eyes, forehead   Quality:  [x] pressure [] tight [x] throbbing [x] sharp [] stabbing   Severity: current 7 with range 3-10  Duration: hours, days  Frequency: 7 days per month  Headaches awaken at night?: yes    Worst time of day: upon waking, morning   Associated with: [x] photophobia [x]  phonophobia [] osmophobia [x] blurred vision  [] double vision [] loss of appetite [x] nausea [x] vomiting [x] dizziness [] vertigo  [] tinnitus [] irritability [] sinus pressure [] problems with concentration   [x] neck tightness   Alleviated by:  [x] sleep [] darkness [x] massage [] heat [] ice [x] medication  Exacerbated by:  [x] fatigue [] light [] noise [] smells [] coughing [] sneezing  [] bending over [x] ovulation [x] menses [] alcohol [x] change in weather [x]  stress  Ipsilateral autonomic: [] nasal congestion [] lacrimation [] ptosis [] injection [] edema [] foreign body sensation [] ear fullness   ICP:  [] transient visual obscurations  [] tinnitus   [] positional headache  [x] non-positional   Sleep habits: trouble staying asleep, unrefreshing sleep - likely due to having two toddlers   Caffeine intake: 1-2 diet cokes  Gyn status (if female):  IUD, breastfeeding but weaning (she still nurses her two year old, only nurses at night)  MIDAS: 24    Current acute treatment:  Zofran  Eletriptan - effective for pain control but pain returns next day   Advil or Tylenol - 5 days per week  Excedrin - 2 days per week    Current prevention:  None     Previously tried/failed acute treatment:  Ibuprofen    Previously tried/failed preventative treatment:      Review of patient's allergies indicates:  No Known Allergies  Current Outpatient Medications   Medication Sig Dispense Refill    ondansetron (ZOFRAN-ODT) 4 MG TbDL Take 1 tablet (4 mg total) by mouth every 8 (eight) hours as needed (nausea). 12 tablet 0    triamcinolone acetonide 0.5% (KENALOG) 0.5 % Crea Apply topically 2 (two) times daily. for 10 days 454 g 0    eletriptan (RELPAX) 40 MG tablet 1 tab PO PRN migraine. May repeat once in 2 hours. Use no more than 10 days per month. 10 tablet 11    prochlorperazine (COMPAZINE) 10 MG tablet Take 1 tablet (10 mg total) by mouth every 6 (six) hours as needed (migraine or nausea). 60 tablet 11    tiZANidine (ZANAFLEX) 4 MG tablet Half or full tablet by mouth at night as needed for muscle spasm 30 tablet 11     No current facility-administered medications for this visit.       Past Medical History  Past Medical History:   Diagnosis Date    Anemia     Asymmetric intrauterine growth restriction affecting pregnancy, antepartum 2/23/2018    Headache     History of poor fetal growth 2/16/2018    Intrauterine growth restriction, antepartum 3/26/2018    IUGR (intrauterine growth restriction) affecting care of mother 3/2/2018    Nausea and vomiting in pregnancy 2/6/2020    NST (non-stress test) nonreactive 2/26/2018       Past Surgical History  History reviewed. No pertinent surgical history.    Family History  Family History   Problem Relation Age of Onset    Cancer Mother 44        breast    Deep vein thrombosis Father     No Known Problems Brother     No  Known Problems Daughter     Cancer Maternal Grandfather         pancreatic    Heart disease Paternal Grandmother     No Known Problems Daughter        Social History  Social History     Socioeconomic History    Marital status:      Spouse name: Fritz Carrera    Number of children: 2   Tobacco Use    Smoking status: Never Smoker    Smokeless tobacco: Never Used   Substance and Sexual Activity    Alcohol use: No    Drug use: No    Sexual activity: Yes     Partners: Male   Social History Narrative    High School [employer] .  Lives with her  and two girls.        Review of Systems  14-point review of systems as follows:   No check marya indicates NEGATIVE response   Constitutional: [] weight loss, [] change to appetite   Eyes: [] change in vision, [] double vision   Ears, nose, mouth, throat: [] frequent nose bleeds, [] ringing in the ears   Respiratory: [] cough, [] wheezing   Cardiovascular: [] chest pain, [] palpitations   Gastrointestinal: [] jaundice, [] nausea/vomiting   Genitourinary: [] incontinence, [] burning with urination   Hematologic/lymphatic: [] easy bruising/bleeding, [] night sweats   Neurological: [] numbness, [] weakness   Endocrine: [] fatigue, [] heat/cold intolerance   Allergy/Immunologic: [] fevers, [] chills   Musculoskeletal: [] muscle pain, [] joint pain   Psychiatric: [] thoughts of harming self/others, [] depression   Integumentary: [] rashes, [] sores that do not heal     Objective:        Vitals:    06/14/22 0950   BP: 103/71   Pulse: 88   Resp: 17     Body mass index is 19.8 kg/m².    6/14/22  Constitutional: appears in no acute distress, well-developed, well-nourished     Eyes: normal conjunctiva, PERRLA    Ears, nose, mouth, throat: external appearance of ears and nose normal, hearing intact     Cardiovascular: regular rate and rhythm, no murmurs appreciated    Respiratory: unlabored respirations, breath sounds normal bilaterally    Gastrointestinal:  no visible abdominal masses, no guarding, no visible hernia    Musculoskeletal: normal tone in all four extremities. No abnormal movements. No pronator drift. No orbit. Symmetric finger tapping. Normal station. Normal regular gait. Normal tandem gait.      Spine:   CERVICAL SPINE:  ROM: normal   MUSCLE SPASM: left  FACET LOADING: left  SPURLING: no  JAKE / MIRNA tender: no     Psychiatric: normal judgment and insight. Oriented to person, place, and time.     Neurologic:   Cortical functions: recent and remote memory intact, normal attention span and concentration, speech fluent, adequate fund of knowledge   Cranial nerves: visual fields full, PERRLA, EOMI, symmetric facial strength, hearing intact, palate elevates symmetrically, shoulder shrug 5/5, tongue protrudes midline   Reflexes: 2+ in the upper and lower extremities, no Anderson  Sensation: intact to temperature throughout   Coordination: normal finger to nose, heel to shin, tandem gait     Data Review:     I have personally reviewed the referring provider's notes, labs, & imaging made available to me today.      RADIOLOGY STUDIES:  I have personally reviewed the pertinent images performed.       No results found for this or any previous visit.    Lab Results   Component Value Date     05/05/2019    K 3.4 (L) 05/05/2019    MG 2.1 05/05/2019     05/05/2019    CO2 30 05/05/2019    BUN 11 05/05/2019    CREATININE 0.59 05/05/2019    GLU 94 05/05/2019    AST 27 05/05/2019    ALT 24 05/05/2019    ALBUMIN 4.6 05/05/2019    PROT 7.0 05/05/2019    BILITOT 0.5 05/05/2019       Lab Results   Component Value Date    WBC 10.17 03/10/2020    HGB 9.1 (L) 03/10/2020    HCT 29.1 (L) 03/10/2020    MCV 81 (L) 03/10/2020     03/10/2020       Lab Results   Component Value Date    TSH 3.460 05/05/2019           Assessment & Plan:       Problem List Items Addressed This Visit        Neuro    Migraine with aura and without status migrainosus, not intractable - Primary     Overview     Migraine headaches that began during college with rare episodes. Headaches are typically unilateral, moderate to severe in intensity, worsen with activity, pounding in quality and associated with sensitivity to light and sound. Headaches are typically unilateral, moderate to severe in intensity, worsen with activity, pounding in quality and associated with sensitivity to light and sound.   Typically had monthly migraines, resolved during pregnancy, and then returned post-partum. In the past two years, since most recent delivery, migraine frequency increasing and now lasting longer. She is breastfeeding which limits options. However, she is weaning and only one feeding daily and at bedtime. Will try to maximize non-pharmaceutical treatment options. Start magnesium to prevent migraines and aura. Add PT to help with neck and muscular tension. Ok to continue eletriptan as it has safety data while breastfeeding. Alternative would be suamtriptam.            Relevant Medications    eletriptan (RELPAX) 40 MG tablet    ketorolac injection 30 mg (Completed)    Other Relevant Orders    Ambulatory referral/consult to Physical/Occupational Therapy      Other Visit Diagnoses     Nausea        Relevant Medications    prochlorperazine (COMPAZINE) 10 MG tablet    Cervical myofascial pain syndrome        Recommend to take tizanidine after nightly breastfeeding.    Relevant Medications    tiZANidine (ZANAFLEX) 4 MG tablet    Other Relevant Orders    Ambulatory referral/consult to Physical/Occupational Therapy              Please call our clinic at [phone] or send a message on the [employer] portal if there are any changes to the plan described below, for example,if you are not contacted for the requested tests, referral(s) within one week, if you are unable to receive the medications prescribed, or if you feel you need to change the treatment course for any reason.     TESTING:  -- none at this time    REFERRALS:  --  physical therapy    PREVENTION (use daily regardless of headache):  -- START Migravent supplement or magnesium alone  magnesium in ONE of the following preparations -               1. Magnesium oxide 800mg daily (the most common over the counter kind, may causes loose stools)              2. Magnesium citrate 400-500mg daily (harder to find, but more neutral on the bowels)              3. Magnesium glycinate 400mg daily (hardest to find, look online, but most bowel-neutral, best absorbed)     AS-NEEDED TREATMENT (use total no more than 10 days per month unless otherwise stated):  -- ok to continue eletriptan as needed for acute attacks  -- START tizanidine at night (TAKE AFTER YOU BREASTFEED). This is a muscle relaxer and it will also make you potentially sleepy. Start with half a tablet to see how you respond but can take up to a whole tablet if needed  -- start compazine. This is a nausea medication that also has anti-migraine properties. Can take daily and will not contribute to rebound headaches      Follow up in about 6 weeks (around 7/26/2022) for follow up with MICHELINE.    Face to Face time with patient: 40  60 minutes of total time spent on the encounter, which includes face to face time and non-face to face time on day of visit preparing to see the patient (eg, review of tests), Obtaining and/or reviewing separately obtained history, Documenting clinical information in the electronic or other health record, Independently interpreting results (not separately reported) and communicating results to the patient/family/caregiver, or Care coordination (not separately reported).     Irma Moe NP

## 2022-06-30 ENCOUNTER — CLINICAL SUPPORT (OUTPATIENT)
Dept: REHABILITATION | Facility: HOSPITAL | Age: 32
End: 2022-06-30
Payer: COMMERCIAL

## 2022-06-30 DIAGNOSIS — M79.18 CERVICAL MYOFASCIAL PAIN SYNDROME: ICD-10-CM

## 2022-06-30 DIAGNOSIS — G43.109 MIGRAINE WITH AURA AND WITHOUT STATUS MIGRAINOSUS, NOT INTRACTABLE: ICD-10-CM

## 2022-06-30 DIAGNOSIS — M54.2 NECK PAIN: ICD-10-CM

## 2022-06-30 PROCEDURE — 97161 PT EVAL LOW COMPLEX 20 MIN: CPT | Mod: PO

## 2022-06-30 PROCEDURE — 97140 MANUAL THERAPY 1/> REGIONS: CPT | Mod: PO

## 2022-07-01 NOTE — PLAN OF CARE
[facility]  Physical Therapy Initial Evaluation    Date: 6/30/2022   Name: Fiona Timmons  Clinic Number: [id number]    Therapy Diagnosis:   Encounter Diagnoses   Name Primary?    Migraine with aura and without status migrainosus, not intractable     Cervical myofascial pain syndrome      Physician: Irma Moe, NP    Physician Orders: PT Eval and Treat   Medical Diagnosis from Referral: G43.109 (ICD-10-CM) - Migraine with aura and without status migrainosus, not intractable M79.18 (ICD-10-CM) - Cervical myofascial pain syndrome     Evaluation Date: 6/30/2022  Authorization Period Expiration: 6/14/23  Plan of Care Expiration: 8/25/22  Progress Note Due: 7/29/22  Visit # / Visits authorized: 1/ 1   FOTO: not collected     Time In: 1600  Time Out: 1645  Total Billable Time: 45 minutes    Precautions: Standard    Subjective   Date of onset: about 4 years ago and getting worse over the last few years  History of current condition - Fiona reports: she has pain and tightness in the neck and upper trap region which ususally accompanies a HA and or Migraine. This has become more frequent over the last 2 years. She get Migraines weekly now and HA a couple of times a week. She has not tried therapy in the past or any other treatment previous. She was referred by Neurology that has started her on some medication.       Past Medical History:   Diagnosis Date    Anemia     Asymmetric intrauterine growth restriction affecting pregnancy, antepartum 2/23/2018    Headache     History of poor fetal growth 2/16/2018    Intrauterine growth restriction, antepartum 3/26/2018    IUGR (intrauterine growth restriction) affecting care of mother 3/2/2018    Nausea and vomiting in pregnancy 2/6/2020    NST (non-stress test) nonreactive 2/26/2018     Fiona Timmons  has no past surgical history on file.    Fiona has a current medication list which includes the following prescription(s):  eletriptan, ondansetron, prochlorperazine, tizanidine, and triamcinolone acetonide 0.5%.    Review of patient's allergies indicates:  No Known Allergies     Imaging, none:     Prior Therapy: none  Social History: lives with their family  Occupation:   Prior Level of Function: independent  Current Level of Function: Independent    Pain:  Current 3/10, worst 8/10, best 1/10   Location: bilateral neck  and HA   Description: Aching, Dull, sharp, Throbbing, Tight and Variable  Aggravating Factors: stress, fatigue, Menses, change in weather  Easing Factors: massage, rest and medication    Pts goals: decrease neck pain and decrease HA/Migraines as much as possible      Objective   Mental status: oriented x3  Posture/ Alignment: Fair    GAIT DEVIATIONS: Fiona amb with normal.    ROM: cervical  ROM:   AROM  Comment   Flexion: WNL     Extension: WNL     Lat Flex R: WNL Trap pain   Lat Flex L: WNL  Trap pain   Rotation R: WNL     Rotation L: WNL     *pain   Dermatomes:   Right Left Comment   C4 intact intact    C5 intact intact    C6 intact intact    C7 intact intact    C8 intact intact    T1 intact intact      Myotomes:   Right Left Comment   Shoulder abduction (C5): 5/5 5/5    Wrist extension (C6): 5/5 5/5    Wrist flexion (C7): 5/5 5/5    Thumb Extension (C8): 5/5 5/5     (T1): 5/5 5/5        Special Tests:  Cervical radiculopathy   (-) Spurlings A, (-) Cervical Quadrant w/out radicular symptoms, (-) Neck Distraction, (-) Cervical rotation < 60 deg ipsilateral side, (-) Peripheralization w/ lower cervical mobility assessment     Palpation:  + TTP: Bilateral upper traps, levator scap, suboccipitals, cervical paraspinals      Pt/family was provided educational information, including: role of PT, goals for PT, scheduling - pt verbalized understanding. Discussed insurance plan with pt.     TREATMENT   Treatment Time In: 1620  Treatment Time Out: 1645  Total Treatment time separate from Evaluation: 25  minutes      Fiona received the following manual therapy techniques: Joint mobilizations, Manual traction and Soft tissue Mobilization were applied to the: neck for 25 minutes, including:  Manual cervical traction  Gentle central PA mobs: Grade II-III C2-T1  Soft tissue mobilization: bilateral upper traps,  levator scaps,    Soft Tissue Palpation Assessment to determine the necessity for Functional Dry Needling  .   Patient provided written and verbal consent to FDN.   FDN performed to bilateral upper traps and levator scap with electrical stimulation      Home Exercises and Patient Education Provided    Education provided:   - progression of treatment  - possible benefits of manual therapy and dry needling  - Importance of HEP    Written Home Exercises Provided: yes.  Exercises were reviewed and Fiona was able to demonstrate them prior to the end of the session.  Fiona demonstrated good  understanding of the education provided.     See EMR under Patient Instructions for exercises provided 6/30/2022.    Assessment     Pt presents with chronic worsening neck pain with associated Migraines and HA. She has good cervical ROM with some soreness ans tightness in the upper traps and paraspinals.She demonstrates some hypomobility of the upper thoracic spine.  She does usually have her neck pain when she is either having a HA or full Migraine. She would benefit from a progressive exercise plan along with manual therapy and dry needling    Pt prognosis is Good.   Pt will benefit from skilled outpatient Physical Therapy to address the deficits stated above and in the chart below, provide pt/family education, and to maximize pt's level of independence.     Plan of care discussed with patient: Yes  Pt's spiritual, cultural and educational needs considered and patient is agreeable to the plan of care and goals as stated below:     Anticipated Barriers for therapy: none    Medical Necessity is demonstrated by the  following  History  Co-morbidities and personal factors that may impact the plan of care Co-morbidities:   none    Personal Factors:   no deficits     low   Examination  Body Structures and Functions, activity limitations and participation restrictions that may impact the plan of care Body Regions:   neck  upper extremities    Body Systems:    gross symmetry  ROM  strength  gross coordinated movement  gait  transfers  transitions  motor control    Participation Restrictions:   none    Activity limitations:   Learning and applying knowledge  no deficits    General Tasks and Commands  no deficits    Communication  no deficits    Mobility  driving (bike, car, motorcycle)    Self care  no deficits    Domestic Life  doing house work (cleaning house, washing dishes, laundry)    Interactions/Relationships  no deficits    Life Areas  employment    Community and Social Life  community life  recreation and leisure         low   Clinical Presentation stable and uncomplicated low   Decision Making/ Complexity Score: low     Goals:  Short Term Goals: 3-4 weeks   Decrease intensity of HA/Migraines by 25% or more  Decrease frequency  of HA/migraines by 25% or more  Decrease neck pain by 25% or more with ADLs    Long Term Goals: 8 weeks   Decrease intensity of HA/Migraines by 50% or more  Decrease frequency  of HA/migraines by 50% or more  Decrease neck pain by 50% or more with ADLs  Independent with HEP      Plan    Plan of care Certification: 6/30/2022 to 8/25/22.    Outpatient Physical Therapy 2 times weekly for 8 weeks to include the following interventions: Manual Therapy, Moist Heat/ Ice, Neuromuscular Re-ed, Patient Education, Self Care, Therapeutic Activities, Therapeutic Exercise and dry needling.     Ravi Nova, PT      I CERTIFY THE NEED FOR THESE SERVICES FURNISHED UNDER THIS PLAN OF TREATMENT AND WHILE UNDER MY CARE   Physician's comments:     Physician's Signature:  ___________________________________________________

## 2022-07-02 PROBLEM — M54.2 NECK PAIN: Status: ACTIVE | Noted: 2022-07-02

## 2022-07-06 ENCOUNTER — CLINICAL SUPPORT (OUTPATIENT)
Dept: REHABILITATION | Facility: HOSPITAL | Age: 32
End: 2022-07-06
Payer: COMMERCIAL

## 2022-07-06 DIAGNOSIS — M54.2 NECK PAIN: Primary | ICD-10-CM

## 2022-07-06 PROCEDURE — 97110 THERAPEUTIC EXERCISES: CPT | Mod: PO,CQ

## 2022-07-06 PROCEDURE — 97140 MANUAL THERAPY 1/> REGIONS: CPT | Mod: PO,CQ

## 2022-07-06 NOTE — PROGRESS NOTES
Physical Therapy Daily Treatment Note     Name: Fiona Timmons  Clinic Number: [id number]    Therapy Diagnosis:   Encounter Diagnosis   Name Primary?    Neck pain Yes     Physician: Irma Moe NP    Visit Date: 7/6/2022   Physician Orders: PT Eval and Treat   Medical Diagnosis from Referral: G43.109 (ICD-10-CM) - Migraine with aura and without status migrainosus, not intractable M79.18 (ICD-10-CM) - Cervical myofascial pain syndrome      Evaluation Date: 6/30/2022  Authorization Period Expiration: 6/14/23  Plan of Care Expiration: 8/25/22  Progress Note Due: 7/29/22  Visit # / Visits authorized: 1/ 20  FOTO: not collected     Time In: 1415  Time Out: 1456  Total Billable Time: 30 minutes    Precautions: Standard    Subjective     Pt reports: she woke up with a headache this morning but she is pain free currently. This started on the (L) side of neck and worked its way into her head. Patient reports feeling better following dry needling and she was headache free for 5 days. She was compliant with home exercise program.  Response to previous treatment: no adverse effect  Functional change: too soon to determine     Pain: 0/10  Location: left neck      Objective     Fiona received therapeutic exercises to develop strength, endurance, ROM, flexibility, posture and core stabilization for  minutes including:  Supine chin tuck 2x10  Supine bruegger (yellow TB) 2x10  Seated (B) upper trap stretch 30 sec x 3  Seated (B) levator scap stretch 30 sec x 3   Seated scapular retractions 2x10    Fiona received the following manual therapy techniques: Soft tissue Mobilization were applied to the: neck for 15 minutes, including:  Manual cervical traction   Gentle central PA mobs: Grade II-III C2-T1(NP)  Soft tissue mobilization: bilateral upper traps,  levator scaps    Home Exercises Provided and Patient Education Provided     Education provided:   - HEP compliance     Written Home Exercises Provided: Patient  instructed to cont prior HEP.  Exercises were reviewed and Fiona was able to demonstrate them prior to the end of the session.  Fiona demonstrated good  understanding of the education provided.     See EMR under Patient Instructions for exercises provided 06/30/2022.    Assessment     Fiona tolerated treatment very well without provocation of headache. Tissue restriction palpable through (L) cervical musculature when compared to (R). Good response to manual therapy techniques as improvements in tissue pliability were achieved. Patient able to progress to light postural exercises without complaints of pain.   Fiona Is progressing well towards her goals.   Pt prognosis is Good.     Pt will continue to benefit from skilled outpatient physical therapy to address the deficits listed in the problem list box on initial evaluation, provide pt/family education and to maximize pt's level of independence in the home and community environment.     Pt's spiritual, cultural and educational needs considered and pt agreeable to plan of care and goals.    Anticipated barriers to physical therapy: none    Goals:   Short Term Goals: 3-4 weeks   Decrease intensity of HA/Migraines by 25% or more  Decrease frequency  of HA/migraines by 25% or more  Decrease neck pain by 25% or more with ADLs     Long Term Goals: 8 weeks   Decrease intensity of HA/Migraines by 50% or more  Decrease frequency  of HA/migraines by 50% or more  Decrease neck pain by 50% or more with ADLs  Independent with HEP     Plan     Continue current POC with emphasis on decreasing headache intensity and frequency while addressing postural control.     Trinity Lopez, PTA

## 2022-07-07 ENCOUNTER — CLINICAL SUPPORT (OUTPATIENT)
Dept: REHABILITATION | Facility: HOSPITAL | Age: 32
End: 2022-07-07
Payer: COMMERCIAL

## 2022-07-07 DIAGNOSIS — M54.2 NECK PAIN: Primary | ICD-10-CM

## 2022-07-07 PROCEDURE — 97110 THERAPEUTIC EXERCISES: CPT | Mod: PO

## 2022-07-07 PROCEDURE — 97140 MANUAL THERAPY 1/> REGIONS: CPT | Mod: PO

## 2022-07-07 NOTE — PROGRESS NOTES
Physical Therapy Daily Treatment Note     Name: Fiona Timmons  Clinic Number: [id number]    Therapy Diagnosis:   No diagnosis found.  Physician: Irma Moe NP    Visit Date: 7/7/2022   Physician Orders: PT Eval and Treat   Medical Diagnosis from Referral: G43.109 (ICD-10-CM) - Migraine with aura and without status migrainosus, not intractable M79.18 (ICD-10-CM) - Cervical myofascial pain syndrome      Evaluation Date: 6/30/2022  Authorization Period Expiration: 6/14/23  Plan of Care Expiration: 8/25/22  Progress Note Due: 7/29/22  Visit # / Visits authorized: 2/ 20  FOTO: not collected     Time In: 1255  Time Out: 1330  Total Billable Time: 35 minutes    Precautions: Standard    Subjective     Pt reports: she did great after last visit w/o a HA until yesterday when she started feeling one. She is having some more right sided neck pain today but still with left sided neck pain with pressure.   Response to previous treatment: no adverse effect  Functional change: too soon to determine     Pain: 4/10  Location:right neck      Objective     Fiona received therapeutic exercises to develop strength, endurance, ROM, flexibility, posture and core stabilization for 10  minutes including:  Cervical ROM x 3 min  Passive  (B) upper trap stretch 30 sec x 3  passive (B) levator scap stretch 30 sec x 3     Fiona received the following manual therapy techniques: Soft tissue Mobilization were applied to the: neck for 25 minutes, including:  Manual cervical traction   Gentle central PA mobs: Grade II-III C2-T1(NP)  Soft tissue mobilization: bilateral upper traps,  levator scaps      Soft Tissue Palpation Assessment to determine the necessity for Functional Dry Needling  .   Patient provided written and verbal consent to FDN.   FDN performed to bilateral upper traps and levator scap with electrical stimulation    Home Exercises Provided and Patient Education Provided     Education provided:   - HEP compliance      Written Home Exercises Provided: Patient instructed to cont prior HEP.  Exercises were reviewed and Fiona was able to demonstrate them prior to the end of the session.  Fiona demonstrated good  understanding of the education provided.     See EMR under Patient Instructions for exercises provided 06/30/2022.    Assessment     Fiona has done well with treatment with decreased HA frequency after initial manual therapy and dry needling. She did well with her treatment this week and today. I will see how she does after today and adjust treatment next week as appropriate.     Fiona Is progressing well towards her goals.   Pt prognosis is Good.     Pt will continue to benefit from skilled outpatient physical therapy to address the deficits listed in the problem list box on initial evaluation, provide pt/family education and to maximize pt's level of independence in the home and community environment.     Pt's spiritual, cultural and educational needs considered and pt agreeable to plan of care and goals.    Anticipated barriers to physical therapy: none    Goals:   Short Term Goals: 3-4 weeks   Decrease intensity of HA/Migraines by 25% or more, progressing, not met  Decrease frequency  of HA/migraines by 25% or more, progressing, not met  Decrease neck pain by 25% or more with ADLs, progressing, not met     Long Term Goals: 8 weeks   Decrease intensity of HA/Migraines by 50% or more, progressing, not met  Decrease frequency  of HA/migraines by 50% or more, progressing, not met  Decrease neck pain by 50% or more with ADLs, progressing, not met  Independent with HEP, progressing , not met     Plan     Continue current POC with emphasis on decreasing headache intensity and frequency while addressing postural control.     Ravi Nova, PT

## 2022-07-14 ENCOUNTER — OFFICE VISIT (OUTPATIENT)
Dept: NEUROLOGY | Facility: CLINIC | Age: 32
End: 2022-07-14
Payer: COMMERCIAL

## 2022-07-14 VITALS
DIASTOLIC BLOOD PRESSURE: 73 MMHG | BODY MASS INDEX: 19.67 KG/M2 | RESPIRATION RATE: 17 BRPM | HEART RATE: 71 BPM | WEIGHT: 111 LBS | HEIGHT: 63 IN | SYSTOLIC BLOOD PRESSURE: 106 MMHG

## 2022-07-14 DIAGNOSIS — M79.18 CERVICAL MYOFASCIAL PAIN SYNDROME: ICD-10-CM

## 2022-07-14 DIAGNOSIS — G43.109 MIGRAINE WITH AURA AND WITHOUT STATUS MIGRAINOSUS, NOT INTRACTABLE: Primary | ICD-10-CM

## 2022-07-14 PROCEDURE — 1160F RVW MEDS BY RX/DR IN RCRD: CPT | Mod: CPTII,S$GLB,, | Performed by: NURSE PRACTITIONER

## 2022-07-14 PROCEDURE — 99214 OFFICE O/P EST MOD 30 MIN: CPT | Mod: S$GLB,,, | Performed by: NURSE PRACTITIONER

## 2022-07-14 PROCEDURE — 3078F PR MOST RECENT DIASTOLIC BLOOD PRESSURE < 80 MM HG: ICD-10-PCS | Mod: CPTII,S$GLB,, | Performed by: NURSE PRACTITIONER

## 2022-07-14 PROCEDURE — 99214 PR OFFICE/OUTPT VISIT, EST, LEVL IV, 30-39 MIN: ICD-10-PCS | Mod: S$GLB,,, | Performed by: NURSE PRACTITIONER

## 2022-07-14 PROCEDURE — 99999 PR PBB SHADOW E&M-EST. PATIENT-LVL III: CPT | Mod: PBBFAC,,, | Performed by: NURSE PRACTITIONER

## 2022-07-14 PROCEDURE — 1159F MED LIST DOCD IN RCRD: CPT | Mod: CPTII,S$GLB,, | Performed by: NURSE PRACTITIONER

## 2022-07-14 PROCEDURE — 99999 PR PBB SHADOW E&M-EST. PATIENT-LVL III: ICD-10-PCS | Mod: PBBFAC,,, | Performed by: NURSE PRACTITIONER

## 2022-07-14 PROCEDURE — 3008F PR BODY MASS INDEX (BMI) DOCUMENTED: ICD-10-PCS | Mod: CPTII,S$GLB,, | Performed by: NURSE PRACTITIONER

## 2022-07-14 PROCEDURE — 1160F PR REVIEW ALL MEDS BY PRESCRIBER/CLIN PHARMACIST DOCUMENTED: ICD-10-PCS | Mod: CPTII,S$GLB,, | Performed by: NURSE PRACTITIONER

## 2022-07-14 PROCEDURE — 3078F DIAST BP <80 MM HG: CPT | Mod: CPTII,S$GLB,, | Performed by: NURSE PRACTITIONER

## 2022-07-14 PROCEDURE — 3074F SYST BP LT 130 MM HG: CPT | Mod: CPTII,S$GLB,, | Performed by: NURSE PRACTITIONER

## 2022-07-14 PROCEDURE — 1159F PR MEDICATION LIST DOCUMENTED IN MEDICAL RECORD: ICD-10-PCS | Mod: CPTII,S$GLB,, | Performed by: NURSE PRACTITIONER

## 2022-07-14 PROCEDURE — 3074F PR MOST RECENT SYSTOLIC BLOOD PRESSURE < 130 MM HG: ICD-10-PCS | Mod: CPTII,S$GLB,, | Performed by: NURSE PRACTITIONER

## 2022-07-14 PROCEDURE — 3008F BODY MASS INDEX DOCD: CPT | Mod: CPTII,S$GLB,, | Performed by: NURSE PRACTITIONER

## 2022-07-14 RX ORDER — RIZATRIPTAN BENZOATE 10 MG/1
TABLET ORAL
Qty: 10 TABLET | Refills: 11 | Status: SHIPPED | OUTPATIENT
Start: 2022-07-14 | End: 2023-07-27 | Stop reason: SDUPTHER

## 2022-07-14 RX ORDER — VENLAFAXINE HYDROCHLORIDE 37.5 MG/1
37.5 CAPSULE, EXTENDED RELEASE ORAL DAILY
Qty: 30 CAPSULE | Refills: 11 | Status: SHIPPED | OUTPATIENT
Start: 2022-07-14 | End: 2022-09-02 | Stop reason: SDUPTHER

## 2022-07-14 NOTE — PATIENT INSTRUCTIONS
Please call our clinic at [phone] or send a message on the [facility] portal if there are any changes to the plan described below, for example,if you are not contacted for the requested tests, referral(s) within one week, if you are unable to receive the medications prescribed, or if you feel you need to change the treatment course for any reason.     TESTING:  -- none at this time    REFERRALS:  -- physical therapy    PREVENTION (use daily regardless of headache):  -- continue Migravent supplement   -- START venlafaxine according to chart on AVS      AS-NEEDED TREATMENT (use total no more than 10 days per month unless otherwise stated):  -- change triptan to rizatriptan  -- START rizatriptan with next migraine. You can repeat two hours later if needed   -- continue tizanidine at night (TAKE AFTER YOU BREASTFEED). This is a muscle relaxer and it will also make you potentially sleepy. Start with half a tablet to see how you respond but can take up to a whole tablet if needed  -- continue compazine. This is a nausea medication that also has anti-migraine properties. Can take daily and will not contribute to rebound headaches    Venlafaxine Long-Acting (Effexor XR) for Headaches     Venlafaxine is an antidepressant medication of the SNRI family (as opposed to the more commonly known SSRIs). It is a useful migraine preventive, although it is not FDA-approved for this purpose (it is approved for anxiety, depression, panic, and social phobia). The drug may also be helpful in certain other painful conditions. It is available as an inexpensive short-acting generic, but we do not recommend this formulation, because missing a single dose can sometimes cause unpleasant withdrawal symptoms. We therefore prescribe the long-acting capsules or tablets.     The drug should be taken in the morning, to reduce the chance of insomnia.  It is often better tolerated than the older tricyclic antidepressants (TCAs, including  nortriptyline, amitriptyline, imipramine, and doxepin), without much in the way of weight-gain or sedation. Severe side effects are uncommon, but milder and more frequent ones include:  Constipation  Dry mouth  Loss of appetite / weight loss / GI upset  Insomnia  Tremor  Increased blood pressure  Sweating and hot flashes  Sexual side effects  Yawning  Teeth grinding  Dizziness  It can sometimes cause a nagging headache  It can sometimes worsen anxiety  Suicidal thoughts (rare; a problem with all antidepressants)     There is a theoretical interaction of venlafaxine (and most other antidepressants) with the prescription migraine attack medications known as triptans. This interaction--called the serotonin syndrome--occurs extremely rarely, if at all (see separate handout) and ranges from barely noticeable to quite serious. Symptoms include fever, racing heart, tremulousness, sweating, twitching, agitation and confusion.                                                                       The drug is increased gradually, with most patients eventually reaching 150mg (some do well on 75mg, others require higher doses, up to 300mg). If you feel that 150mg is too strong, contact us to drop back down to 75mg.         Venlafaxine Long-Acting (Effexor XR)   Week # Morning Dose   1 37.5mg   2 37.5mg + 37.5mg = 75mg   3 and after 150mg      Like all headache prevention drugs, once a good dose is reached it can take several weeks before an effect is noticed. Benefits can increase over time. Stick with it as long as the side effects are tolerable.     If you need to stop the medicine and you have been taking 150mg or more we typically recommend gradually decreasing the drug rather than stopping cold turkey to avoid common and unpleasant withdrawal symptoms, including zapping feelings and dizziness.

## 2022-07-14 NOTE — PROGRESS NOTES
Date of service: 7/14/2022  Referring provider: No ref. provider found    Subjective:      Chief complaint: Headache       Patient ID: Fiona Timmons is a 32 y.o. female who presents for follow up of headache     History of Present Illness    INTERVAL HISTORY 7/14/22    Last visit was one month ago and at that time I referred her to PT and started eletriptan, tizanidine, and compazine. She was breastfeeding at last visit.     Today she reports she is about the same. She was doing better intensity wise but reports headaches are more frequent now. Headaches are holocephalic. current pain 8 with range 3-9. She has 4 headache days per week. She takes Excedrin and Relpax. She is no longer breastfeeding. Otherwise information below is reviewed and verified with no changes made unless noted.     ORIGINAL HEADACHE HISTORY - 6/14/22   Age at onset and course over time: four years ago with worsening in past 2 years  She previously would get one migraine per month but in the past few months she is getting at least two migraines per month which are lasting 2-3 days each.    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    Aura - floaters in vision, usually unilateral, lasts an hour, always accompanied by severe migraine    Family history - mother had migraines  Last eye exam - 2021, scheduled this month as well    Location: neck, over eyes, forehead   Quality:  [x] pressure [] tight [x] throbbing [x] sharp [] stabbing   Severity: current 7 with range 3-10  Duration: hours, days  Frequency: 7 days per month  Headaches awaken at night?: yes    Worst time of day: upon waking, morning   Associated with: [x] photophobia [x]  phonophobia [] osmophobia [x] blurred vision  [] double vision [] loss of appetite [x] nausea [x] vomiting [x] dizziness [] vertigo  [] tinnitus [] irritability [] sinus pressure [] problems with concentration   [x] neck tightness   Alleviated by:  [x] sleep [] darkness [x] massage [] heat [] ice [x]  medication  Exacerbated by:  [x] fatigue [] light [] noise [] smells [] coughing [] sneezing  [] bending over [x] ovulation [x] menses [] alcohol [x] change in weather [x]  stress  Ipsilateral autonomic: [] nasal congestion [] lacrimation [] ptosis [] injection [] edema [] foreign body sensation [] ear fullness   ICP:  [] transient visual obscurations  [] tinnitus   [] positional headache  [x] non-positional   Sleep habits: trouble staying asleep, unrefreshing sleep - likely due to having two toddlers   Caffeine intake: 1-2 diet cokes  Gyn status (if female): IUD, breastfeeding but weaning (she still nurses her two year old, only nurses at night)  MIDAS: 24    Current acute treatment:  Zofran  Eletriptan - effective for pain control but pain returns next day   Advil or Tylenol - 5 days per week  Excedrin - 2 days per week  Tizanidine  Compazine    Current prevention:  Migravent     Previously tried/failed acute treatment:  Ibuprofen    Previously tried/failed preventative treatment:  None     Review of patient's allergies indicates:  No Known Allergies  Current Outpatient Medications   Medication Sig Dispense Refill    ondansetron (ZOFRAN-ODT) 4 MG TbDL Take 1 tablet (4 mg total) by mouth every 8 (eight) hours as needed (nausea). 12 tablet 0    prochlorperazine (COMPAZINE) 10 MG tablet Take 1 tablet (10 mg total) by mouth every 6 (six) hours as needed (migraine or nausea). 60 tablet 11    tiZANidine (ZANAFLEX) 4 MG tablet Half or full tablet by mouth at night as needed for muscle spasm 30 tablet 11    rizatriptan (MAXALT) 10 MG tablet 1 tab PO PRN migraine. May repeat every 2 hours for max 3 tabs in 24 hours. Use no more than 10 days per month. 10 tablet 11    triamcinolone acetonide 0.5% (KENALOG) 0.5 % Crea Apply topically 2 (two) times daily. for 10 days 454 g 0    venlafaxine (EFFEXOR-XR) 37.5 MG 24 hr capsule Take 1 capsule (37.5 mg total) by mouth once daily. 30 capsule 11     No current  facility-administered medications for this visit.       Past Medical History  Past Medical History:   Diagnosis Date    Anemia     Asymmetric intrauterine growth restriction affecting pregnancy, antepartum 2/23/2018    Headache     History of poor fetal growth 2/16/2018    Intrauterine growth restriction, antepartum 3/26/2018    IUGR (intrauterine growth restriction) affecting care of mother 3/2/2018    Nausea and vomiting in pregnancy 2/6/2020    NST (non-stress test) nonreactive 2/26/2018       Past Surgical History  No past surgical history on file.    Family History  Family History   Problem Relation Age of Onset    Cancer Mother 44        breast    Deep vein thrombosis Father     No Known Problems Brother     No Known Problems Daughter     Cancer Maternal Grandfather         pancreatic    Heart disease Paternal Grandmother     No Known Problems Daughter        Social History  Social History     Socioeconomic History    Marital status:      Spouse name: Fritz Carrera    Number of children: 2   Tobacco Use    Smoking status: Never Smoker    Smokeless tobacco: Never Used   Substance and Sexual Activity    Alcohol use: No    Drug use: No    Sexual activity: Yes     Partners: Male   Social History Narrative    [employer] School [employer] .  Lives with her  and two girls.        Review of Systems  14-point review of systems as follows:   No check marya indicates NEGATIVE response   Constitutional: [] weight loss, [] change to appetite   Eyes: [] change in vision, [] double vision   Ears, nose, mouth, throat: [] frequent nose bleeds, [] ringing in the ears   Respiratory: [] cough, [] wheezing   Cardiovascular: [] chest pain, [] palpitations   Gastrointestinal: [] jaundice, [] nausea/vomiting   Genitourinary: [] incontinence, [] burning with urination   Hematologic/lymphatic: [] easy bruising/bleeding, [] night sweats   Neurological: [] numbness, [] weakness   Endocrine: []  fatigue, [] heat/cold intolerance   Allergy/Immunologic: [] fevers, [] chills   Musculoskeletal: [] muscle pain, [] joint pain   Psychiatric: [] thoughts of harming self/others, [] depression   Integumentary: [] rashes, [] sores that do not heal     Objective:        Vitals:    07/14/22 1059   BP: 106/73   Pulse: 71   Resp: 17     Body mass index is 19.66 kg/m².    7/14/22  Constitutional:   She appears well-developed and well-nourished. She is well groomed     Neurological Exam:  General: well-developed, well-nourished, no distress  Mental status: Awake and alert  Speech language: No dysarthria or aphasia on conversation  Cranial nerves: Face symmetric  Motor: Moves all extremities well  Coordination: No ataxia. No tremor.    6/14/22  Constitutional: appears in no acute distress, well-developed, well-nourished     Eyes: normal conjunctiva, PERRLA    Ears, nose, mouth, throat: external appearance of ears and nose normal, hearing intact     Cardiovascular: regular rate and rhythm, no murmurs appreciated    Respiratory: unlabored respirations, breath sounds normal bilaterally    Gastrointestinal: no visible abdominal masses, no guarding, no visible hernia    Musculoskeletal: normal tone in all four extremities. No abnormal movements. No pronator drift. No orbit. Symmetric finger tapping. Normal station. Normal regular gait. Normal tandem gait.      Spine:   CERVICAL SPINE:  ROM: normal   MUSCLE SPASM: left  FACET LOADING: left  SPURLING: no  JAKE / MIRNA tender: no     Psychiatric: normal judgment and insight. Oriented to person, place, and time.     Neurologic:   Cortical functions: recent and remote memory intact, normal attention span and concentration, speech fluent, adequate fund of knowledge   Cranial nerves: visual fields full, PERRLA, EOMI, symmetric facial strength, hearing intact, palate elevates symmetrically, shoulder shrug 5/5, tongue protrudes midline   Reflexes: 2+ in the upper and lower extremities, no  Anderson  Sensation: intact to temperature throughout   Coordination: normal finger to nose, heel to shin, tandem gait     Data Review:     I have personally reviewed the referring provider's notes, labs, & imaging made available to me today.      RADIOLOGY STUDIES:  I have personally reviewed the pertinent images performed.       No results found for this or any previous visit.    Lab Results   Component Value Date     05/05/2019    K 3.4 (L) 05/05/2019    MG 2.1 05/05/2019     05/05/2019    CO2 30 05/05/2019    BUN 11 05/05/2019    CREATININE 0.59 05/05/2019    GLU 94 05/05/2019    AST 27 05/05/2019    ALT 24 05/05/2019    ALBUMIN 4.6 05/05/2019    PROT 7.0 05/05/2019    BILITOT 0.5 05/05/2019       Lab Results   Component Value Date    WBC 10.17 03/10/2020    HGB 9.1 (L) 03/10/2020    HCT 29.1 (L) 03/10/2020    MCV 81 (L) 03/10/2020     03/10/2020       Lab Results   Component Value Date    TSH 3.460 05/05/2019           Assessment & Plan:       Problem List Items Addressed This Visit        Neuro    Migraine with aura and without status migrainosus, not intractable - Primary    Overview     Migraine headaches that began during college with rare episodes. Headaches are typically unilateral, moderate to severe in intensity, worsen with activity, pounding in quality and associated with sensitivity to light and sound. Headaches are typically unilateral, moderate to severe in intensity, worsen with activity, pounding in quality and associated with sensitivity to light and sound.   Typically had monthly migraines, resolved during pregnancy, and then returned post-partum. In the past two years, since most recent delivery, migraine frequency increasing and now lasting longer.     She is no longer breastfeeding. Will change tritpan to rizatriptan as eletriptan not effective. We discussed prevention options to include seizure, antidepressant, and beta blocker medications. Will start with venlafaxine.      Continue PT to help with neck and muscular tension.            Current Assessment & Plan     Continue magnesium. Add venlafaxine. Change triptan to rizatriptan.            Relevant Medications    venlafaxine (EFFEXOR-XR) 37.5 MG 24 hr capsule    rizatriptan (MAXALT) 10 MG tablet      Other Visit Diagnoses     Cervical myofascial pain syndrome        Continue PT              Please call our clinic at [phone] or send a message on the [facility] portal if there are any changes to the plan described below, for example,if you are not contacted for the requested tests, referral(s) within one week, if you are unable to receive the medications prescribed, or if you feel you need to change the treatment course for any reason.     TESTING:  -- none at this time    REFERRALS:  -- physical therapy    PREVENTION (use daily regardless of headache):  -- continue Migravent supplement   -- START venlafaxine according to chart on AVS      AS-NEEDED TREATMENT (use total no more than 10 days per month unless otherwise stated):  -- change triptan to rizatriptan  -- START rizatriptan with next migraine. You can repeat two hours later if needed   -- continue tizanidine at night (TAKE AFTER YOU BREASTFEED). This is a muscle relaxer and it will also make you potentially sleepy. Start with half a tablet to see how you respond but can take up to a whole tablet if needed  -- continue compazine. This is a nausea medication that also has anti-migraine properties. Can take daily and will not contribute to rebound headaches      Follow up in about 10 weeks (around 9/22/2022) for follow up with nicky TOBIAS.    Face to Face time with patient: 20  30 minutes of total time spent on the encounter, which includes face to face time and non-face to face time on day of visit preparing to see the patient (eg, review of tests), Obtaining and/or reviewing separately obtained history, Documenting clinical information in the electronic or other health  record, Independently interpreting results (not separately reported) and communicating results to the patient/family/caregiver, or Care coordination (not separately reported).     Irma Moe, NP

## 2022-07-14 NOTE — ASSESSMENT & PLAN NOTE
Continue magnesium. Add venlafaxine. Change triptan to rizatriptan.    Medicare Annual Wellness Visit  Name: Juarez Bland Date: 10/18/2020   MRN: [id number] Sex: Female   Age: 76 y.o. Ethnicity: Non-/Non    : 1946 Race: James Crews is here for Medicare AWV and Hypertension (6mo follow up)    Screenings for behavioral, psychosocial and functional/safety risks, and cognitive dysfunction are all negative except as indicated below. These results, as well as other patient data from the [address] form, are documented in Flowsheets linked to this Encounter. Hypertension   This is a chronic problem. The current episode started more than 1 year ago. The problem is unchanged. The problem is controlled. Pertinent negatives include no anxiety, blurred vision, chest pain, headaches, malaise/fatigue, neck pain, orthopnea, palpitations, peripheral edema, PND, shortness of breath or sweats. There are no associated agents to hypertension. Risk factors for coronary artery disease include stress and post-menopausal state. Past treatments include ACE inhibitors. The current treatment provides significant improvement. There are no compliance problems. Allergies   Allergen Reactions    Bactrim [Sulfamethoxazole-Trimethoprim]     Cortizone-10 [Hydrocortisone]     Levaquin [Levofloxacin In D5w]     Neosporin [Neomycin-Polymyxin-Gramicidin]          Prior to Visit Medications    Medication Sig Taking?  Authorizing Provider   oxybutynin (DITROPAN) 5 MG tablet take 1 tablet by mouth three times a day Yes Jose Pruitt MD   lisinopril (PRINIVIL;ZESTRIL) 10 MG tablet take 1 tablet by mouth once daily Yes Jose Pruitt MD   aspirin 325 MG tablet Take 325 mg by mouth daily Yes Historical Provider, MD   glucosamine-chondroitin 750-600 MG TABS tablet Take 1 tablet by mouth 3 times daily Yes Historical Provider, MD   Multiple Vitamins-Minerals (BEROCCA PO) Take by mouth Yes Historical Provider, MD   Calcium Carb-Cholecalciferol (CALCIUM 600 + D PO) Take by mouth 2 times daily Yes Historical Provider, MD   loratadine (CLARITIN) 10 MG tablet Take 10 mg by mouth daily Yes Historical Provider, MD         Past Medical History:   Diagnosis Date    Carotid stenosis, asymptomatic, bilateral 7/18/2018    [facility] [address]     Hypertension     Stasis ulcer ([facility] [address]        Past Surgical History:   Procedure Laterality Date    ADENOIDECTOMY      COLECTOMY  02/2004    COLONOSCOPY  04/2009    normal    COLONOSCOPY  01/18/2013    normal    HERNIA REPAIR  01/2008    JOINT REPLACEMENT      TONSILLECTOMY      VEIN SURGERY      stripped         Family History   Problem Relation Age of Onset    Obesity Mother     Cancer Father     Alzheimer's Disease Father     Colon Cancer Son        CareTeam (Including outside providers/suppliers regularly involved in providing care):   Patient Care Team:  Poppy Escobar MD as PCP - General (Family Medicine)  Poppy Escobar MD as PCP - [facility] Empaneled Provider    Wt Readings from Last 3 Encounters:   10/12/20 152 lb (68.9 kg)   01/22/20 153 lb (69.4 kg)   07/24/19 152 lb 4.8 oz (69.1 kg)     Vitals:    10/12/20 0945   BP: 132/86   Site: Left Upper Arm   Position: Sitting   Cuff Size: Large Adult   Pulse: 85   SpO2: 97%   Weight: 152 lb (68.9 kg)   Height: 5' 2.4\" (1.585 m)     Body mass index is 27.45 kg/m². Based upon direct observation of the patient, evaluation of cognition reveals recent and remote memory intact. Physical Exam  Vitals signs and nursing note reviewed. Constitutional:       Appearance: Normal appearance. She is well-developed. HENT:      Head: Normocephalic and atraumatic. Eyes:      Conjunctiva/sclera: Conjunctivae normal.      Pupils: Pupils are equal, round, and reactive to light. Neck:      Musculoskeletal: Normal range of motion and neck supple. Thyroid: No thyromegaly. Vascular: No JVD.       Comments: Bruit bilaterally in the carotids, left greater than the right  Cardiovascular:      Rate and Rhythm: Normal rate and regular rhythm. Pulses: Normal pulses. Heart sounds: Normal heart sounds. No murmur. No friction rub. Pulmonary:      Effort: Pulmonary effort is normal. No respiratory distress. Breath sounds: Normal breath sounds. Abdominal:      Palpations: Abdomen is soft. Musculoskeletal: Normal range of motion. Right lower leg: No edema. Left lower leg: No edema. Comments: Varicosities but no edema in the LE   Lymphadenopathy:      Cervical: No cervical adenopathy. Skin:     General: Skin is warm. Capillary Refill: Capillary refill takes less than 2 seconds. Neurological:      General: No focal deficit present. Mental Status: She is alert and oriented to person, place, and time. Cranial Nerves: No cranial nerve deficit. Sensory: No sensory deficit. Psychiatric:         Behavior: Behavior normal.         Thought Content: Thought content normal.         Judgment: Judgment normal.        Diagnosis Orders   1. Routine general medical examination at a health care facility     2. Therapeutic drug monitoring  Comprehensive Metabolic Panel   3. Visit for screening mammogram  [facility] DIGITAL SCREEN BILATERAL   4. Encounter for lipid screening for cardiovascular disease  Lipid Panel   5. Essential hypertension     6. Hypercholesteremia       Blood pressure and hypercholesterolemia are well controlled at this time, no symptoms. Continue on her current medications. Encouraged to stay active with her diet and exercise. Patient's complete Health Risk Assessment and screening values have been reviewed and are found in Flowsheets. The following problems were reviewed today and where indicated follow up appointments were made and/or referrals ordered.     Positive Risk Factor Screenings with Interventions:       General Health and ACP:  General  In general, how would you say your health is?: Excellent  In the past 7 days, have you experienced any of the following? New or Increased Pain, New or Increased Fatigue, Loneliness, Social Isolation, Stress or Anger?: (!) Stress  Do you get the social and emotional support that you need?: Yes  Do you have a Living Will?: Yes  Advance Directives     Power of  Living Will ACP-Advance Directive ACP-Power of     Not on File [place] on 01/18/19 Filed [address] [place] Risk Interventions:  · Stress: patient's son is going through chemo and radiation for colon cancer which has caused her increase stress but she thinks she is dealing with it well. Hearing/Vision:  No exam data present  Hearing/Vision  Do you or your family notice any trouble with your hearing?: No  Do you have difficulty driving, watching TV, or doing any of your daily activities because of your eyesight?: No  Have you had an eye exam within the past year?: (!) No  Hearing/Vision Interventions:  · Vision concerns:  patient has upcomming appointment for new glasses. Personalized Preventive Plan   Current Health Maintenance Status    There is no immunization history on file for this patient. Health Maintenance   Topic Date Due    DTaP/Tdap/Td vaccine (1 - Tdap) 08/11/1965    Shingles Vaccine (1 of 2) 08/11/1996    Pneumococcal 65+ years Vaccine (1 of 1 - PPSV23) 08/11/2011    Annual Wellness Visit (AWV)  05/29/2019    Flu vaccine (1) 09/01/2020    Breast cancer screen  07/26/2021    Potassium monitoring  10/17/2021    Creatinine monitoring  10/17/2021    Lipid screen  10/17/2025    Colon cancer screen colonoscopy  07/28/2026    DEXA (modify frequency per FRAX score)  Completed    Hepatitis C screen  Completed    Hepatitis A vaccine  Aged Out    Hepatitis B vaccine  Aged Out    Hib vaccine  Aged Out    Meningococcal (ACWY) vaccine  Aged Out     Recommendations for Adaptive Payments Due: see orders and patient instructions/AVS.  .   Recommended screening schedule for the next 5-10 years is provided to the patient in written form: see Patient Instructions/AVS.    I, Checo CECILE James, 36/[id number]/[id number], performed the documented evaluation under the direct supervision of the attending physician. Reviewed and addendums made by myself on the day of the visit with the patient. Kristyn Childs MD  Advance Care Planning   The patient has the following advanced directives on file:  Advance Directives     Power of  Living Will ACP-Advance Directive ACP-Power of     Not on File [place] on 01/18/19 [place]          The patient has appointed the following active healthcare agents:    [address]: [address]: Donald Tuckerd - [phone]    The Patient has the following current code status:    Code Status: Full Code    Visit Documentation:  I discussed [address] with Milind Bowens today which included the patient's choices for care and treatment in the case of a health event that adversely affects decision-making abilities. She stated the Advance Care Directives on file are current. We discussed her current values, goals and care preferences at the end of life. Milind Bowens has no questions at this time and has agreed to keep me up-to-date should anything change.          Kristyn Childs MD  10/18/2020

## 2022-09-02 DIAGNOSIS — G43.109 MIGRAINE WITH AURA AND WITHOUT STATUS MIGRAINOSUS, NOT INTRACTABLE: ICD-10-CM

## 2022-09-02 RX ORDER — VENLAFAXINE HYDROCHLORIDE 37.5 MG/1
37.5 CAPSULE, EXTENDED RELEASE ORAL DAILY
Qty: 30 CAPSULE | Refills: 11 | Status: SHIPPED | OUTPATIENT
Start: 2022-09-02 | End: 2022-09-06 | Stop reason: SDUPTHER

## 2022-09-06 RX ORDER — VENLAFAXINE HYDROCHLORIDE 37.5 MG/1
75 CAPSULE, EXTENDED RELEASE ORAL DAILY
Qty: 60 CAPSULE | Refills: 11 | Status: SHIPPED | OUTPATIENT
Start: 2022-09-06 | End: 2023-10-06 | Stop reason: SDUPTHER

## 2023-07-27 DIAGNOSIS — G43.109 MIGRAINE WITH AURA AND WITHOUT STATUS MIGRAINOSUS, NOT INTRACTABLE: ICD-10-CM

## 2023-07-31 RX ORDER — RIZATRIPTAN BENZOATE 10 MG/1
TABLET ORAL
Qty: 10 TABLET | Refills: 11 | Status: SHIPPED | OUTPATIENT
Start: 2023-07-31

## 2023-09-29 ENCOUNTER — PATIENT MESSAGE (OUTPATIENT)
Dept: NEUROLOGY | Facility: CLINIC | Age: 33
End: 2023-09-29
Payer: COMMERCIAL

## 2023-10-06 ENCOUNTER — OFFICE VISIT (OUTPATIENT)
Dept: NEUROLOGY | Facility: CLINIC | Age: 33
End: 2023-10-06
Payer: COMMERCIAL

## 2023-10-06 VITALS
HEIGHT: 63 IN | DIASTOLIC BLOOD PRESSURE: 77 MMHG | BODY MASS INDEX: 20.2 KG/M2 | HEART RATE: 103 BPM | SYSTOLIC BLOOD PRESSURE: 103 MMHG | RESPIRATION RATE: 17 BRPM | WEIGHT: 114 LBS | TEMPERATURE: 98 F

## 2023-10-06 DIAGNOSIS — G43.109 MIGRAINE WITH AURA AND WITHOUT STATUS MIGRAINOSUS, NOT INTRACTABLE: Primary | ICD-10-CM

## 2023-10-06 PROCEDURE — 3074F PR MOST RECENT SYSTOLIC BLOOD PRESSURE < 130 MM HG: ICD-10-PCS | Mod: CPTII,S$GLB,, | Performed by: NURSE PRACTITIONER

## 2023-10-06 PROCEDURE — 3008F PR BODY MASS INDEX (BMI) DOCUMENTED: ICD-10-PCS | Mod: CPTII,S$GLB,, | Performed by: NURSE PRACTITIONER

## 2023-10-06 PROCEDURE — 1160F PR REVIEW ALL MEDS BY PRESCRIBER/CLIN PHARMACIST DOCUMENTED: ICD-10-PCS | Mod: CPTII,S$GLB,, | Performed by: NURSE PRACTITIONER

## 2023-10-06 PROCEDURE — 3078F PR MOST RECENT DIASTOLIC BLOOD PRESSURE < 80 MM HG: ICD-10-PCS | Mod: CPTII,S$GLB,, | Performed by: NURSE PRACTITIONER

## 2023-10-06 PROCEDURE — 1160F RVW MEDS BY RX/DR IN RCRD: CPT | Mod: CPTII,S$GLB,, | Performed by: NURSE PRACTITIONER

## 2023-10-06 PROCEDURE — 1159F PR MEDICATION LIST DOCUMENTED IN MEDICAL RECORD: ICD-10-PCS | Mod: CPTII,S$GLB,, | Performed by: NURSE PRACTITIONER

## 2023-10-06 PROCEDURE — 99999 PR PBB SHADOW E&M-EST. PATIENT-LVL IV: CPT | Mod: PBBFAC,,, | Performed by: NURSE PRACTITIONER

## 2023-10-06 PROCEDURE — 99213 PR OFFICE/OUTPT VISIT, EST, LEVL III, 20-29 MIN: ICD-10-PCS | Mod: S$GLB,,, | Performed by: NURSE PRACTITIONER

## 2023-10-06 PROCEDURE — 99213 OFFICE O/P EST LOW 20 MIN: CPT | Mod: S$GLB,,, | Performed by: NURSE PRACTITIONER

## 2023-10-06 PROCEDURE — 3074F SYST BP LT 130 MM HG: CPT | Mod: CPTII,S$GLB,, | Performed by: NURSE PRACTITIONER

## 2023-10-06 PROCEDURE — 3008F BODY MASS INDEX DOCD: CPT | Mod: CPTII,S$GLB,, | Performed by: NURSE PRACTITIONER

## 2023-10-06 PROCEDURE — 99999 PR PBB SHADOW E&M-EST. PATIENT-LVL IV: ICD-10-PCS | Mod: PBBFAC,,, | Performed by: NURSE PRACTITIONER

## 2023-10-06 PROCEDURE — 3078F DIAST BP <80 MM HG: CPT | Mod: CPTII,S$GLB,, | Performed by: NURSE PRACTITIONER

## 2023-10-06 PROCEDURE — 1159F MED LIST DOCD IN RCRD: CPT | Mod: CPTII,S$GLB,, | Performed by: NURSE PRACTITIONER

## 2023-10-06 RX ORDER — RIMEGEPANT SULFATE 75 MG/75MG
75 TABLET, ORALLY DISINTEGRATING ORAL DAILY PRN
Qty: 16 TABLET | Refills: 11 | Status: SHIPPED | OUTPATIENT
Start: 2023-10-06

## 2023-10-06 RX ORDER — VENLAFAXINE HYDROCHLORIDE 37.5 MG/1
75 CAPSULE, EXTENDED RELEASE ORAL DAILY
Qty: 60 CAPSULE | Refills: 11 | Status: SHIPPED | OUTPATIENT
Start: 2023-10-06 | End: 2024-10-05

## 2023-10-06 NOTE — PATIENT INSTRUCTIONS
Please call our clinic at [phone] or send a message on the [email or web address] portal if there are any changes to the plan described below, for example,if you are not contacted for the requested tests, referral(s) within one week, if you are unable to receive the medications prescribed, or if you feel you need to change the treatment course for any reason.     TESTING:  -- none at this time    REFERRALS:  -- none     PREVENTION (use daily regardless of headache):  -- continue Migravent supplement   -- continue venlafaxine     AS-NEEDED TREATMENT (use total no more than 10 days per month unless otherwise stated):  -- ok to continue rizatriptan with next migraine. You can repeat two hours later if needed   -- START Nurtec with next migraine. This dissolves under the tongue and is only taken once in 24 hour time frame.  -- continue tizanidine at night. This is a muscle relaxer and it will also make you potentially sleepy. Start with half a tablet to see how you respond but can take up to a whole tablet if needed  -- continue compazine. This is a nausea medication that also has anti-migraine properties. Can take daily and will not contribute to rebound headaches

## 2023-10-06 NOTE — PROGRESS NOTES
Date of service: 10/6/2023  Referring provider: No ref. provider found    Subjective:      Chief complaint: Headache       Patient ID: Fiona Timmons is a 33 y.o. female who presents for follow up of headache     History of Present Illness    INTERVAL HISTORY 10/6/23    Last visit was 15 months ago and at that time we added venlafaxine. Since last visit she has miscarried. No plans to conceive.     Today she reports she is the same. Headaches seem to be more persistent in the last several days. Her vision is now affected. Current pain 2 with range 1-8. She typically has menstrual migraines. She takes rizatriptan 1-2 days per week.  Otherwise information below is reviewed and verified with no changes made     INTERVAL HISTORY 7/14/22    Last visit was one month ago and at that time I referred her to PT and started eletriptan, tizanidine, and compazine. She was breastfeeding at last visit.     Today she reports she is about the same. She was doing better intensity wise but reports headaches are more frequent now. Headaches are holocephalic. current pain 8 with range 3-9. She has 4 headache days per week. She takes Excedrin and Relpax. She is no longer breastfeeding. Otherwise information below is reviewed and verified with no changes made unless noted.     ORIGINAL HEADACHE HISTORY - 6/14/22   Age at onset and course over time: four years ago with worsening in past 2 years  She previously would get one migraine per month but in the past few months she is getting at least two migraines per month which are lasting 2-3 days each.    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    Aura - floaters in vision, usually unilateral, lasts an hour, always accompanied by severe migraine    Family history - mother had migraines  Last eye exam - 2021, scheduled this month as well    Location: neck, over eyes, forehead   Quality:  [x] pressure [] tight [x] throbbing [x] sharp [] stabbing   Severity: current 7 with range  3-10  Duration: hours, days  Frequency: 7 days per month  Headaches awaken at night?: yes    Worst time of day: upon waking, morning   Associated with: [x] photophobia [x]  phonophobia [] osmophobia [x] blurred vision  [] double vision [] loss of appetite [x] nausea [x] vomiting [x] dizziness [] vertigo  [] tinnitus [] irritability [] sinus pressure [] problems with concentration   [x] neck tightness   Alleviated by:  [x] sleep [] darkness [x] massage [] heat [] ice [x] medication  Exacerbated by:  [x] fatigue [] light [] noise [] smells [] coughing [] sneezing  [] bending over [x] ovulation [x] menses [] alcohol [x] change in weather [x]  stress  Ipsilateral autonomic: [] nasal congestion [] lacrimation [] ptosis [] injection [] edema [] foreign body sensation [] ear fullness   ICP:  [] transient visual obscurations  [] tinnitus   [] positional headache  [x] non-positional   Sleep habits: trouble staying asleep, unrefreshing sleep - likely due to having two toddlers   Caffeine intake: 1-2 diet cokes  Gyn status (if female): IUD, breastfeeding but weaning (she still nurses her two year old, only nurses at night)  MIDAS: 24    Current acute treatment:  Zofran  Rizatriptan   Tizanidine  Compazine    Current prevention:  Migravent   Venlafaxine - 75 mg    Previously tried/failed acute treatment:  Ibuprofen  Eletriptan - effective for pain control but pain returns next day   Advil or Tylenol - 5 days per week  Excedrin - 2 days per week    Previously tried/failed preventative treatment:  None     Review of patient's allergies indicates:  No Known Allergies  Current Outpatient Medications   Medication Sig Dispense Refill    ondansetron (ZOFRAN-ODT) 4 MG TbDL Take 1 tablet (4 mg total) by mouth every 8 (eight) hours as needed (nausea). 12 tablet 0    prochlorperazine (COMPAZINE) 10 MG tablet Take 1 tablet (10 mg total) by mouth every 6 (six) hours as needed (migraine or nausea). 60 tablet 11    rizatriptan (MAXALT) 10 MG  tablet 1 tab PO PRN migraine. May repeat every 2 hours for max 3 tabs in 24 hours. Use no more than 10 days per month. 10 tablet 11    tiZANidine (ZANAFLEX) 4 MG tablet Half or full tablet by mouth at night as needed for muscle spasm 30 tablet 11    rimegepant (NURTEC) 75 mg odt Take 1 tablet (75 mg total) by mouth daily as needed. Place ODT tablet on the tongue; alternatively the ODT tablet may be placed under the tongue 16 tablet 11    triamcinolone acetonide 0.5% (KENALOG) 0.5 % Crea Apply topically 2 (two) times daily. for 10 days 454 g 0    venlafaxine (EFFEXOR-XR) 37.5 MG 24 hr capsule Take 2 capsules (75 mg total) by mouth once daily. 60 capsule 11     No current facility-administered medications for this visit.       Past Medical History  Past Medical History:   Diagnosis Date    Anemia     Asymmetric intrauterine growth restriction affecting pregnancy, antepartum 2/23/2018    Headache     History of poor fetal growth 2/16/2018    Intrauterine growth restriction, antepartum 3/26/2018    IUGR (intrauterine growth restriction) affecting care of mother 3/2/2018    Nausea and vomiting in pregnancy 2/6/2020    NST (non-stress test) nonreactive 2/26/2018       Past Surgical History  History reviewed. No pertinent surgical history.    Family History  Family History   Problem Relation Age of Onset    Cancer Mother 44        breast    Deep vein thrombosis Father     No Known Problems Brother     No Known Problems Daughter     Cancer Maternal Grandfather         pancreatic    Heart disease Paternal Grandmother     No Known Problems Daughter        Social History  Social History     Socioeconomic History    Marital status:      Spouse name: Fritz Carrera    Number of children: 2   Tobacco Use    Smoking status: Never    Smokeless tobacco: Never   Substance and Sexual Activity    Alcohol use: No    Drug use: No    Sexual activity: Yes     Partners: Male   Social History Narrative    [employer]  teacher.  Lives with her  and two girls.      Objective:        Vitals:    10/06/23 1044   BP: 103/77   Pulse: 103   Resp: 17   Temp: 97.8 °F (36.6 °C)       Body mass index is 20.19 kg/m².    10/6/23  Constitutional:   She appears well-developed and well-nourished. She is well groomed     Neurological Exam:  General: well-developed, well-nourished, no distress  Mental status: Awake and alert  Speech language: No dysarthria or aphasia on conversation  Cranial nerves: Face symmetric  Motor: Moves all extremities well  Coordination: No ataxia. No tremor.    6/14/22  Constitutional: appears in no acute distress, well-developed, well-nourished     Eyes: normal conjunctiva, PERRLA    Ears, nose, mouth, throat: external appearance of ears and nose normal, hearing intact     Cardiovascular: regular rate and rhythm, no murmurs appreciated    Respiratory: unlabored respirations, breath sounds normal bilaterally    Gastrointestinal: no visible abdominal masses, no guarding, no visible hernia    Musculoskeletal: normal tone in all four extremities. No abnormal movements. No pronator drift. No orbit. Symmetric finger tapping. Normal station. Normal regular gait. Normal tandem gait.      Spine:   CERVICAL SPINE:  ROM: normal   MUSCLE SPASM: left  FACET LOADING: left  SPURLING: no  JAKE / MIRNA tender: no     Psychiatric: normal judgment and insight. Oriented to person, place, and time.     Neurologic:   Cortical functions: recent and remote memory intact, normal attention span and concentration, speech fluent, adequate fund of knowledge   Cranial nerves: visual fields full, PERRLA, EOMI, symmetric facial strength, hearing intact, palate elevates symmetrically, shoulder shrug 5/5, tongue protrudes midline   Reflexes: 2+ in the upper and lower extremities, no [place]  Sensation: intact to temperature throughout   Coordination: normal finger to nose, heel to shin, tandem gait     Data Review:     I have personally reviewed the  referring provider's notes, labs, & imaging made available to me today.      RADIOLOGY STUDIES:  I have personally reviewed the pertinent images performed.       No results found for this or any previous visit.    Lab Results   Component Value Date     04/08/2023    K 3.8 04/08/2023    MG 2.1 05/05/2019     04/08/2023    CO2 27 04/08/2023    BUN 12 04/08/2023    CREATININE 0.58 04/08/2023     04/08/2023    AST 39 (H) 04/08/2023    ALT 36 (H) 04/08/2023    ALBUMIN 4.7 04/08/2023    PROT 7.5 04/08/2023    BILITOT 0.9 04/08/2023       Lab Results   Component Value Date    WBC 4.72 04/08/2023    HGB 13.3 04/08/2023    HCT 39.9 04/08/2023    MCV 90 04/08/2023     04/08/2023       Lab Results   Component Value Date    TSH 3.460 05/05/2019           Assessment & Plan:       Problem List Items Addressed This Visit          Neuro    Migraine with aura and without status migrainosus, not intractable - Primary    Overview     Migraine headaches that began during college with rare episodes. Headaches are typically unilateral, moderate to severe in intensity, worsen with activity, pounding in quality and associated with sensitivity to light and sound. Headaches are typically unilateral, moderate to severe in intensity, worsen with activity, pounding in quality and associated with sensitivity to light and sound.   Typically had monthly migraines, resolved during pregnancy, and then returned post-partum. In the past two years, since most recent delivery, migraine frequency increasing and now lasting longer.     She is no longer breastfeeding or pregnant. She has been doing well on venlafaxine 75 mg. She wishes to optimize acute treatment. Add geapnt. Nurtec would be ideal due to long half life. Her migraines last several days.                Relevant Medications    rimegepant (NURTEC) 75 mg odt    venlafaxine (EFFEXOR-XR) 37.5 MG 24 hr capsule             Please call our clinic at [phone] or send a  message on the [employer] portal if there are any changes to the plan described below, for example,if you are not contacted for the requested tests, referral(s) within one week, if you are unable to receive the medications prescribed, or if you feel you need to change the treatment course for any reason.     TESTING:  -- none at this time    REFERRALS:  -- none     PREVENTION (use daily regardless of headache):  -- continue Migravent supplement   -- continue venlafaxine     AS-NEEDED TREATMENT (use total no more than 10 days per month unless otherwise stated):  -- ok to continue rizatriptan with next migraine. You can repeat two hours later if needed   -- START Nurtec with next migraine. This dissolves under the tongue and is only taken once in 24 hour time frame.  -- continue tizanidine at night. This is a muscle relaxer and it will also make you potentially sleepy. Start with half a tablet to see how you respond but can take up to a whole tablet if needed  -- continue compazine. This is a nausea medication that also has anti-migraine properties. Can take daily and will not contribute to rebound headaches      Follow up in about 3 months (around 1/6/2024).      Irma Moe, NP

## 2024-06-26 DIAGNOSIS — G43.109 MIGRAINE WITH AURA AND WITHOUT STATUS MIGRAINOSUS, NOT INTRACTABLE: ICD-10-CM

## 2024-06-26 RX ORDER — RIZATRIPTAN BENZOATE 10 MG/1
TABLET ORAL
Qty: 10 TABLET | Refills: 11 | Status: SHIPPED | OUTPATIENT
Start: 2024-06-26

## 2024-07-03 ENCOUNTER — OFFICE VISIT (OUTPATIENT)
Dept: NEUROLOGY | Facility: CLINIC | Age: 34
End: 2024-07-03
Payer: COMMERCIAL

## 2024-07-03 VITALS
SYSTOLIC BLOOD PRESSURE: 105 MMHG | TEMPERATURE: 97 F | DIASTOLIC BLOOD PRESSURE: 71 MMHG | HEIGHT: 63 IN | HEART RATE: 106 BPM | WEIGHT: 112 LBS | RESPIRATION RATE: 18 BRPM | BODY MASS INDEX: 19.84 KG/M2

## 2024-07-03 DIAGNOSIS — M79.18 CERVICAL MYOFASCIAL PAIN SYNDROME: ICD-10-CM

## 2024-07-03 DIAGNOSIS — G43.109 MIGRAINE WITH AURA AND WITHOUT STATUS MIGRAINOSUS, NOT INTRACTABLE: Primary | ICD-10-CM

## 2024-07-03 PROCEDURE — 99999 PR PBB SHADOW E&M-EST. PATIENT-LVL IV: CPT | Mod: PBBFAC,,, | Performed by: NURSE PRACTITIONER

## 2024-07-03 PROCEDURE — 3074F SYST BP LT 130 MM HG: CPT | Mod: CPTII,S$GLB,, | Performed by: NURSE PRACTITIONER

## 2024-07-03 PROCEDURE — 3078F DIAST BP <80 MM HG: CPT | Mod: CPTII,S$GLB,, | Performed by: NURSE PRACTITIONER

## 2024-07-03 PROCEDURE — 1160F RVW MEDS BY RX/DR IN RCRD: CPT | Mod: CPTII,S$GLB,, | Performed by: NURSE PRACTITIONER

## 2024-07-03 PROCEDURE — 3008F BODY MASS INDEX DOCD: CPT | Mod: CPTII,S$GLB,, | Performed by: NURSE PRACTITIONER

## 2024-07-03 PROCEDURE — 99213 OFFICE O/P EST LOW 20 MIN: CPT | Mod: S$GLB,,, | Performed by: NURSE PRACTITIONER

## 2024-07-03 PROCEDURE — 1159F MED LIST DOCD IN RCRD: CPT | Mod: CPTII,S$GLB,, | Performed by: NURSE PRACTITIONER

## 2024-07-03 RX ORDER — VENLAFAXINE HYDROCHLORIDE 150 MG/1
150 CAPSULE, EXTENDED RELEASE ORAL DAILY
Qty: 30 CAPSULE | Refills: 11 | Status: SHIPPED | OUTPATIENT
Start: 2024-07-03 | End: 2025-07-03

## 2024-07-03 RX ORDER — NORETHINDRONE ACETATE AND ETHINYL ESTRADIOL AND FERROUS FUMARATE 1MG-20(21)
1 KIT ORAL
COMMUNITY
Start: 2024-04-19

## 2024-07-03 RX ORDER — TIZANIDINE 4 MG/1
TABLET ORAL
Qty: 30 TABLET | Refills: 11 | Status: SHIPPED | OUTPATIENT
Start: 2024-07-03

## 2024-07-03 NOTE — PROGRESS NOTES
Date of service: 7/3/2024  Referring provider: No ref. provider found    Subjective:      Chief complaint: Headache       Patient ID: Fiona Timmons is a 34 y.o. female who presents for follow up of headache     History of Present Illness    INTERVAL HISTORY 7/3/24    Last visit was nine months ago and at that time she was having 1-2 migraines per week.    Today she reports she is better. She is having more pain in the jaw area. Current pain 2 with range 2-8. She has at least one headache day per week but especially during menstrual cycle. She takes rizatriptan and Nurtec two days per week. She is interested in increasing venlafaxine dose. Otherwise information below is reviewed and verified with no changes made     INTERVAL HISTORY 10/6/23    Last visit was 15 months ago and at that time we added venlafaxine. Since last visit she has miscarried. No plans to conceive.     Today she reports she is the same. Headaches seem to be more persistent in the last several days. Her vision is now affected. Current pain 2 with range 1-8. She typically has menstrual migraines. She takes rizatriptan 1-2 days per week.  Otherwise information below is reviewed and verified with no changes made     INTERVAL HISTORY 7/14/22    Last visit was one month ago and at that time I referred her to PT and started eletriptan, tizanidine, and compazine. She was breastfeeding at last visit.     Today she reports she is about the same. She was doing better intensity wise but reports headaches are more frequent now. Headaches are holocephalic. current pain 8 with range 3-9. She has 4 headache days per week. She takes Excedrin and Relpax. She is no longer breastfeeding. Otherwise information below is reviewed and verified with no changes made unless noted.     ORIGINAL HEADACHE HISTORY - 6/14/22   Age at onset and course over time: four years ago with worsening in past 2 years  She previously would get one migraine per month but in the past few  months she is getting at least two migraines per month which are lasting 2-3 days each.    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    Aura - floaters in vision, usually unilateral, lasts an hour, always accompanied by severe migraine    Family history - mother had migraines  Last eye exam - 2021, scheduled this month as well    Location: neck, over eyes, forehead   Quality:  [x] pressure [] tight [x] throbbing [x] sharp [] stabbing   Severity: current 7 with range 3-10  Duration: hours, days  Frequency: 7 days per month  Headaches awaken at night?: yes    Worst time of day: upon waking, morning   Associated with: [x] photophobia [x]  phonophobia [] osmophobia [x] blurred vision  [] double vision [] loss of appetite [x] nausea [x] vomiting [x] dizziness [] vertigo  [] tinnitus [] irritability [] sinus pressure [] problems with concentration   [x] neck tightness   Alleviated by:  [x] sleep [] darkness [x] massage [] heat [] ice [x] medication  Exacerbated by:  [x] fatigue [] light [] noise [] smells [] coughing [] sneezing  [] bending over [x] ovulation [x] menses [] alcohol [x] change in weather [x]  stress  Ipsilateral autonomic: [] nasal congestion [] lacrimation [] ptosis [] injection [] edema [] foreign body sensation [] ear fullness   ICP:  [] transient visual obscurations  [] tinnitus   [] positional headache  [x] non-positional   Sleep habits: trouble staying asleep, unrefreshing sleep - likely due to having two toddlers   Caffeine intake: 1-2 diet cokes  Gyn status (if female): IUD, breastfeeding but weaning (she still nurses her two year old, only nurses at night)  MIDAS: 24    Current acute treatment:  Zofran  Rizatriptan   Tizanidine  Compazine  Nurtec    Current prevention:  Migravent   Venlafaxine - 75 mg    Previously tried/failed acute treatment:  Ibuprofen  Eletriptan - effective for pain control but pain returns next day   Advil or Tylenol - 5 days per week  Excedrin - 2 days per  week    Previously tried/failed preventative treatment:  None     Review of patient's allergies indicates:  No Known Allergies  Current Outpatient Medications   Medication Sig Dispense Refill    JUNEL FE 1/20, 28, 1 mg-20 mcg (21)/75 mg (7) per tablet Take 1 tablet by mouth.      ondansetron (ZOFRAN-ODT) 4 MG TbDL Take 1 tablet (4 mg total) by mouth every 8 (eight) hours as needed (nausea). 12 tablet 0    rimegepant (NURTEC) 75 mg odt Take 1 tablet (75 mg total) by mouth daily as needed. Place ODT tablet on the tongue; alternatively the ODT tablet may be placed under the tongue 16 tablet 11    rizatriptan (MAXALT) 10 MG tablet TAKE 1 TABLET AS NEEDED FOR MIGRAINE. MAY REPEAT EVERY 2 HOURS FOR MAX 3 TABS IN 24 HOURS. USE NO MORE THAN 10 DAYS PER MONTH. 10 tablet 11    tiZANidine (ZANAFLEX) 4 MG tablet Half or full tablet by mouth at night as needed for muscle spasm 30 tablet 11    triamcinolone acetonide 0.5% (KENALOG) 0.5 % Crea Apply topically 2 (two) times daily. for 10 days 454 g 0    venlafaxine (EFFEXOR-XR) 150 MG Cp24 Take 1 capsule (150 mg total) by mouth once daily. 30 capsule 11     No current facility-administered medications for this visit.       Past Medical History  Past Medical History:   Diagnosis Date    Anemia     Asymmetric intrauterine growth restriction affecting pregnancy, antepartum 2/23/2018    Headache     History of poor fetal growth 2/16/2018    Intrauterine growth restriction, antepartum 3/26/2018    IUGR (intrauterine growth restriction) affecting care of mother 3/2/2018    Nausea and vomiting in pregnancy 2/6/2020    NST (non-stress test) nonreactive 2/26/2018       Past Surgical History  History reviewed. No pertinent surgical history.    Family History  Family History   Problem Relation Name Age of Onset    Cancer Mother  44        breast    Deep vein thrombosis Father      No Known Problems Brother      No Known Problems Daughter      Cancer Maternal Grandfather          pancreatic     Heart disease Paternal Grandmother      No Known Problems Daughter         Social History  Social History     Socioeconomic History    Marital status:      Spouse name: Fritz Carrera    Number of children: 2   Tobacco Use    Smoking status: Never    Smokeless tobacco: Never   Substance and Sexual Activity    Alcohol use: No    Drug use: No    Sexual activity: Yes     Partners: Male   Social History Narrative    High School [employer] .  Lives with her  and two girls.     Social Determinants of Health     Financial Resource Strain: Low Risk  (7/3/2024)    Overall Financial Resource Strain (CARDIA)     Difficulty of Paying Living Expenses: Not very hard   Food Insecurity: No Food Insecurity (7/3/2024)    Hunger Vital Sign     Worried About Running Out of Food in the Last Year: Never true     Ran Out of Food in the Last Year: Never true   Physical Activity: Insufficiently Active (7/3/2024)    Exercise Vital Sign     Days of Exercise per Week: 4 days     Minutes of Exercise per Session: 30 min   Stress: Stress Concern Present (7/3/2024)    [employer] - Occupational Stress Questionnaire     Feeling of Stress : Rather much   Housing Stability: Unknown (7/3/2024)    Housing Stability Vital Sign     Unable to Pay for Housing in the Last Year: No      Objective:        Vitals:    07/03/24 1509   BP: 105/71   Pulse: 106   Resp: 18   Temp: 97 °F (36.1 °C)         Body mass index is 19.84 kg/m².    7/3/24  Constitutional:   She appears well-developed and well-nourished. She is well groomed     Neurological Exam:  General: well-developed, well-nourished, no distress  Mental status: Awake and alert  Speech language: No dysarthria or aphasia on conversation  Cranial nerves: Face symmetric  Motor: Moves all extremities well  Coordination: No ataxia. No tremor.      Data Review:     I have personally reviewed the referring provider's notes, labs, & imaging made available to me  today.      RADIOLOGY STUDIES:  I have personally reviewed the pertinent images performed.       No results found for this or any previous visit.    Lab Results   Component Value Date     04/08/2023    K 3.8 04/08/2023    MG 2.1 05/05/2019     04/08/2023    CO2 27 04/08/2023    BUN 12 04/08/2023    CREATININE 0.58 04/08/2023     04/08/2023    AST 39 (H) 04/08/2023    ALT 36 (H) 04/08/2023    ALBUMIN 4.7 04/08/2023    PROT 7.5 04/08/2023    BILITOT 0.9 04/08/2023       Lab Results   Component Value Date    WBC 4.72 04/08/2023    HGB 13.3 04/08/2023    HCT 39.9 04/08/2023    MCV 90 04/08/2023     04/08/2023       Lab Results   Component Value Date    TSH 3.460 05/05/2019           Assessment & Plan:       Problem List Items Addressed This Visit          Neuro    Migraine with aura and without status migrainosus, not intractable - Primary    Overview     Migraine headaches that began during college with rare episodes. Headaches are typically unilateral, moderate to severe in intensity, worsen with activity, pounding in quality and associated with sensitivity to light and sound. Headaches are typically unilateral, moderate to severe in intensity, worsen with activity, pounding in quality and associated with sensitivity to light and sound.   Typically had monthly migraines, resolved during pregnancy, and then returned post-partum. In the past two years, since most recent delivery, migraine frequency increasing and now lasting longer.     She has been doing well on venlafaxine however wishes to increase dose to try to further reduce frequency. Will increase to 150 mg daily. Side effects discussed.    Continue Nurtec and rizatriptan for acute attacks.              Relevant Medications    venlafaxine (EFFEXOR-XR) 150 MG Cp24     Other Visit Diagnoses       Cervical myofascial pain syndrome        Recommend to take tizanidine after nightly breastfeeding.    Relevant Medications    tiZANidine  (ZANAFLEX) 4 MG tablet                    Please call our clinic at [phone] or send a message on the [facility] portal if there are any changes to the plan described below, for example,if you are not contacted for the requested tests, referral(s) within one week, if you are unable to receive the medications prescribed, or if you feel you need to change the treatment course for any reason.     TESTING:  -- none at this time    REFERRALS:  -- none     PREVENTION (use daily regardless of headache):  -- continue Migravent supplement   -- INCREASE venlafaxine to 150 mg daily     AS-NEEDED TREATMENT (use total no more than 10 days per month unless otherwise stated):  -- ok to continue rizatriptan with next migraine. You can repeat two hours later if needed   -- continue Nurtec with next migraine. This dissolves under the tongue and is only taken once in 24 hour time frame.  -- continue tizanidine at night. This is a muscle relaxer and it will also make you potentially sleepy. Start with half a tablet to see how you respond but can take up to a whole tablet if needed        Follow up in about 3 months (around 10/3/2024).      Irma Moe NP

## 2024-07-03 NOTE — PATIENT INSTRUCTIONS
Please call our clinic at [phone] or send a message on the [facility] portal if there are any changes to the plan described below, for example,if you are not contacted for the requested tests, referral(s) within one week, if you are unable to receive the medications prescribed, or if you feel you need to change the treatment course for any reason.     TESTING:  -- none at this time    REFERRALS:  -- none     PREVENTION (use daily regardless of headache):  -- continue Migravent supplement   -- INCREASE venlafaxine to 150 mg daily     AS-NEEDED TREATMENT (use total no more than 10 days per month unless otherwise stated):  -- ok to continue rizatriptan with next migraine. You can repeat two hours later if needed   -- continue Nurtec with next migraine. This dissolves under the tongue and is only taken once in 24 hour time frame.  -- continue tizanidine at night. This is a muscle relaxer and it will also make you potentially sleepy. Start with half a tablet to see how you respond but can take up to a whole tablet if needed

## 2024-10-02 ENCOUNTER — PATIENT MESSAGE (OUTPATIENT)
Dept: NEUROLOGY | Facility: CLINIC | Age: 34
End: 2024-10-02
Payer: COMMERCIAL

## 2024-10-02 DIAGNOSIS — R11.0 NAUSEA: Primary | ICD-10-CM

## 2024-10-03 RX ORDER — ONDANSETRON 4 MG/1
4 TABLET, ORALLY DISINTEGRATING ORAL EVERY 6 HOURS PRN
Qty: 30 TABLET | Refills: 11 | Status: SHIPPED | OUTPATIENT
Start: 2024-10-03

## 2024-10-28 DIAGNOSIS — G43.109 MIGRAINE WITH AURA AND WITHOUT STATUS MIGRAINOSUS, NOT INTRACTABLE: ICD-10-CM

## 2024-10-29 RX ORDER — RIMEGEPANT SULFATE 75 MG/75MG
75 TABLET, ORALLY DISINTEGRATING ORAL DAILY PRN
Qty: 16 TABLET | Refills: 11 | Status: SHIPPED | OUTPATIENT
Start: 2024-10-29

## 2024-11-05 ENCOUNTER — OFFICE VISIT (OUTPATIENT)
Dept: NEUROLOGY | Facility: CLINIC | Age: 34
End: 2024-11-05
Payer: COMMERCIAL

## 2024-11-05 VITALS
DIASTOLIC BLOOD PRESSURE: 76 MMHG | SYSTOLIC BLOOD PRESSURE: 113 MMHG | BODY MASS INDEX: 19.72 KG/M2 | WEIGHT: 111.31 LBS | HEIGHT: 63 IN | HEART RATE: 111 BPM | RESPIRATION RATE: 17 BRPM | TEMPERATURE: 98 F

## 2024-11-05 DIAGNOSIS — G43.109 MIGRAINE WITH AURA AND WITHOUT STATUS MIGRAINOSUS, NOT INTRACTABLE: Primary | ICD-10-CM

## 2024-11-05 DIAGNOSIS — R00.0 TACHYCARDIA: ICD-10-CM

## 2024-11-05 DIAGNOSIS — F45.8 BRUXISM: ICD-10-CM

## 2024-11-05 PROCEDURE — 1159F MED LIST DOCD IN RCRD: CPT | Mod: CPTII,S$GLB,, | Performed by: NURSE PRACTITIONER

## 2024-11-05 PROCEDURE — 99214 OFFICE O/P EST MOD 30 MIN: CPT | Mod: S$GLB,,, | Performed by: NURSE PRACTITIONER

## 2024-11-05 PROCEDURE — 3078F DIAST BP <80 MM HG: CPT | Mod: CPTII,S$GLB,, | Performed by: NURSE PRACTITIONER

## 2024-11-05 PROCEDURE — 1160F RVW MEDS BY RX/DR IN RCRD: CPT | Mod: CPTII,S$GLB,, | Performed by: NURSE PRACTITIONER

## 2024-11-05 PROCEDURE — 99999 PR PBB SHADOW E&M-EST. PATIENT-LVL IV: CPT | Mod: PBBFAC,,, | Performed by: NURSE PRACTITIONER

## 2024-11-05 PROCEDURE — 3074F SYST BP LT 130 MM HG: CPT | Mod: CPTII,S$GLB,, | Performed by: NURSE PRACTITIONER

## 2024-11-05 PROCEDURE — 3008F BODY MASS INDEX DOCD: CPT | Mod: CPTII,S$GLB,, | Performed by: NURSE PRACTITIONER

## 2024-11-05 RX ORDER — PROPRANOLOL HYDROCHLORIDE 10 MG/1
10 TABLET ORAL 2 TIMES DAILY
Qty: 60 TABLET | Refills: 11 | Status: SHIPPED | OUTPATIENT
Start: 2024-11-05 | End: 2025-11-05

## 2024-11-05 RX ORDER — UBROGEPANT 100 MG/1
TABLET ORAL
Qty: 16 TABLET | Refills: 11 | Status: SHIPPED | OUTPATIENT
Start: 2024-11-05

## 2024-11-05 NOTE — PATIENT INSTRUCTIONS
Please call our clinic at [phone] or send a message on the [employer] portal if there are any changes to the plan described below, for example,if you are not contacted for the requested tests, referral(s) within one week, if you are unable to receive the medications prescribed, or if you feel you need to change the treatment course for any reason.     TESTING:  -- none at this time    REFERRALS:  -- physical therapy     PREVENTION (use daily regardless of headache):  -- continue Migravent supplement   -- continue venlafaxine to 150 mg daily   -- START propranolol once daily and can increase to twice daily if tolerating    AS-NEEDED TREATMENT (use total no more than 10 days per month unless otherwise stated):  -- ok to continue rizatriptan with next migraine. You can repeat two hours later if needed   -- stop Nurtec   -- START Ubrelvy with next migraine. You can repeat two hours later if needed. With this medication do not drink grapefruit juice or eat grapefruit or some medications like ketoconazole, itraconazole, or antibiotics clarithromycin   -- continue tizanidine at night. This is a muscle relaxer and it will also make you potentially sleepy. Start with half a tablet to see how you respond but can take up to a whole tablet if needed

## 2024-11-05 NOTE — PROGRESS NOTES
Date of service: 11/5/2024  Referring provider: No ref. provider found    Subjective:      Chief complaint: Headache       Patient ID: Fiona Timmons is a 34 y.o. female who presents for follow up of headache     History of Present Illness    INTERVAL HISTORY 11/5/24    Last visit was four months ago and at that time she was doing better.    Today she reports she is the same. Current pain 1 with range 1-8. She has about one headache day per week. She takes rizatriptan and Nurtec once per week. Otherwise information below is reviewed and verified with no changes made     INTERVAL HISTORY 7/3/24    Last visit was nine months ago and at that time she was having 1-2 migraines per week.    Today she reports she is better. She is having more pain in the jaw area. Current pain 2 with range 2-8. She has at least one headache day per week but especially during menstrual cycle. She takes rizatriptan and Nurtec two days per week. She is interested in increasing venlafaxine dose. Otherwise information below is reviewed and verified with no changes made     INTERVAL HISTORY 10/6/23    Last visit was 15 months ago and at that time we added venlafaxine. Since last visit she has miscarried. No plans to conceive.     Today she reports she is the same. Headaches seem to be more persistent in the last several days. Her vision is now affected. Current pain 2 with range 1-8. She typically has menstrual migraines. She takes rizatriptan 1-2 days per week.  Otherwise information below is reviewed and verified with no changes made     INTERVAL HISTORY 7/14/22    Last visit was one month ago and at that time I referred her to PT and started eletriptan, tizanidine, and compazine. She was breastfeeding at last visit.     Today she reports she is about the same. She was doing better intensity wise but reports headaches are more frequent now. Headaches are holocephalic. current pain 8 with range 3-9. She has 4 headache days per week. She takes  Excedrin and Relpax. She is no longer breastfeeding. Otherwise information below is reviewed and verified with no changes made unless noted.     ORIGINAL HEADACHE HISTORY - 6/14/22   Age at onset and course over time: four years ago with worsening in past 2 years  She previously would get one migraine per month but in the past few months she is getting at least two migraines per month which are lasting 2-3 days each.    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    Aura - floaters in vision, usually unilateral, lasts an hour, always accompanied by severe migraine    Family history - mother had migraines  Last eye exam - 2021, scheduled this month as well    Location: neck, over eyes, forehead   Quality:  [x] pressure [] tight [x] throbbing [x] sharp [] stabbing   Severity: current 7 with range 3-10  Duration: hours, days  Frequency: 7 days per month  Headaches awaken at night?: yes    Worst time of day: upon waking, morning   Associated with: [x] photophobia [x]  phonophobia [] osmophobia [x] blurred vision  [] double vision [] loss of appetite [x] nausea [x] vomiting [x] dizziness [] vertigo  [] tinnitus [] irritability [] sinus pressure [] problems with concentration   [x] neck tightness   Alleviated by:  [x] sleep [] darkness [x] massage [] heat [] ice [x] medication  Exacerbated by:  [x] fatigue [] light [] noise [] smells [] coughing [] sneezing  [] bending over [x] ovulation [x] menses [] alcohol [x] change in weather [x]  stress  Ipsilateral autonomic: [] nasal congestion [] lacrimation [] ptosis [] injection [] edema [] foreign body sensation [] ear fullness   ICP:  [] transient visual obscurations  [] tinnitus   [] positional headache  [x] non-positional   Sleep habits: trouble staying asleep, unrefreshing sleep - likely due to having two toddlers   Caffeine intake: 1-2 diet cokes  Gyn status (if female): IUD, breastfeeding but weaning (she still nurses her two year old, only nurses at night)  MIDAS:  24    Current acute treatment:  Zofran  Rizatriptan   Tizanidine  Compazine  Nurtec    Current prevention:  Migravent   Venlafaxine - 150 mg    Previously tried/failed acute treatment:  Ibuprofen  Eletriptan - effective for pain control but pain returns next day   Advil or Tylenol - 5 days per week  Excedrin - 2 days per week    Previously tried/failed preventative treatment:  None     Review of patient's allergies indicates:  No Known Allergies  Current Outpatient Medications   Medication Sig Dispense Refill    JUNEL FE 1/20, 28, 1 mg-20 mcg (21)/75 mg (7) per tablet Take 1 tablet by mouth.      ondansetron (ZOFRAN-ODT) 4 MG TbDL Take 1 tablet (4 mg total) by mouth every 6 (six) hours as needed (nausea). 30 tablet 11    rizatriptan (MAXALT) 10 MG tablet TAKE 1 TABLET AS NEEDED FOR MIGRAINE. MAY REPEAT EVERY 2 HOURS FOR MAX 3 TABS IN 24 HOURS. USE NO MORE THAN 10 DAYS PER MONTH. 10 tablet 11    tiZANidine (ZANAFLEX) 4 MG tablet Half or full tablet by mouth at night as needed for muscle spasm 30 tablet 11    venlafaxine (EFFEXOR-XR) 150 MG Cp24 Take 1 capsule (150 mg total) by mouth once daily. 30 capsule 11    propranoloL (INDERAL) 10 MG tablet Take 1 tablet (10 mg total) by mouth 2 (two) times daily. 60 tablet 11    triamcinolone acetonide 0.5% (KENALOG) 0.5 % Crea Apply topically 2 (two) times daily. for 10 days 454 g 0    ubrogepant (UBRELVY) 100 mg tablet Take 1 tablet by mouth as needed for migraine. May repeat in 2 hours if needed. Max 2 tab per day 16 tablet 11     No current facility-administered medications for this visit.       Past Medical History  Past Medical History:   Diagnosis Date    Anemia     Asymmetric intrauterine growth restriction affecting pregnancy, antepartum 2/23/2018    Headache     History of poor fetal growth 2/16/2018    Intrauterine growth restriction, antepartum 3/26/2018    IUGR (intrauterine growth restriction) affecting care of mother 3/2/2018    Nausea and vomiting in pregnancy  2/6/2020    NST (non-stress test) nonreactive 2/26/2018       Past Surgical History  History reviewed. No pertinent surgical history.    Family History  Family History   Problem Relation Name Age of Onset    Cancer Mother  44        breast    Deep vein thrombosis Father      No Known Problems Brother      No Known Problems Daughter      Cancer Maternal Grandfather          pancreatic    Heart disease Paternal Grandmother      No Known Problems Daughter         Social History  Social History     Socioeconomic History    Marital status:      Spouse name: Fritz Carrera    Number of children: 2   Tobacco Use    Smoking status: Never    Smokeless tobacco: Never   Substance and Sexual Activity    Alcohol use: No    Drug use: No    Sexual activity: Yes     Partners: Male   Social History Narrative    High School Special .  Lives with her  and two girls.     Social Drivers of Health     Financial Resource Strain: Low Risk  (7/3/2024)    Overall Financial Resource Strain (CARDIA)     Difficulty of Paying Living Expenses: Not very hard   Food Insecurity: No Food Insecurity (7/3/2024)    Hunger Vital Sign     Worried About Running Out of Food in the Last Year: Never true     Ran Out of Food in the Last Year: Never true   Physical Activity: Insufficiently Active (7/3/2024)    Exercise Vital Sign     Days of Exercise per Week: 4 days     Minutes of Exercise per Session: 30 min   Stress: Stress Concern Present (7/3/2024)    [employer] [place] [employer] - Occupational Stress Questionnaire     Feeling of Stress : Rather much   Housing Stability: Unknown (7/3/2024)    Housing Stability Vital Sign     Unable to Pay for Housing in the Last Year: No      Objective:        Vitals:    11/05/24 1453   BP: 113/76   Pulse: (!) 111   Resp: 17   Temp: 97.5 °F (36.4 °C)           Body mass index is 19.72 kg/m².    11/5/24  Constitutional:   She appears well-developed and well-nourished. She is well  groomed     Neurological Exam:  General: well-developed, well-nourished, no distress  Mental status: Awake and alert  Speech language: No dysarthria or aphasia on conversation  Cranial nerves: Face symmetric  Motor: Moves all extremities well  Coordination: No ataxia. No tremor.      Data Review:     I have personally reviewed the referring provider's notes, labs, & imaging made available to me today.      RADIOLOGY STUDIES:  I have personally reviewed the pertinent images performed.       No results found for this or any previous visit.    Lab Results   Component Value Date     04/08/2023    K 3.8 04/08/2023    MG 2.1 05/05/2019     04/08/2023    CO2 27 04/08/2023    BUN 12 04/08/2023    CREATININE 0.58 04/08/2023     04/08/2023    AST 39 (H) 04/08/2023    ALT 36 (H) 04/08/2023    ALBUMIN 4.7 04/08/2023    PROT 7.5 04/08/2023    BILITOT 0.9 04/08/2023       Lab Results   Component Value Date    WBC 4.72 04/08/2023    HGB 13.3 04/08/2023    HCT 39.9 04/08/2023    MCV 90 04/08/2023     04/08/2023       Lab Results   Component Value Date    TSH 3.460 05/05/2019           Assessment & Plan:       Problem List Items Addressed This Visit          Neuro    Migraine with aura and without status migrainosus, not intractable - Primary    Overview     Migraine headaches that began during college with rare episodes. Headaches are typically unilateral, moderate to severe in intensity, worsen with activity, pounding in quality and associated with sensitivity to light and sound. Headaches are typically unilateral, moderate to severe in intensity, worsen with activity, pounding in quality and associated with sensitivity to light and sound.   Typically had monthly migraines, resolved during pregnancy, and then returned post-partum. In the past two years, since most recent delivery, migraine frequency increasing and now lasting longer.     She has been doing well on venlafaxine however still having at least  one migraine per week. Will add propranolol     Continue rizatriptan for acute attacks and trial Ubrelvy.              Current Assessment & Plan     Continue venlafaxine and add propranolol.          Relevant Medications    propranoloL (INDERAL) 10 MG tablet    ubrogepant (UBRELVY) 100 mg tablet    Other Relevant Orders    Ambulatory referral/consult to Physical/Occupational Therapy     Other Visit Diagnoses       Bruxism        Relevant Orders    Ambulatory referral/consult to Physical/Occupational Therapy    Tachycardia        Has not worsened on venlafaxine. Add propranolol                      Please call our clinic at [phone] or send a message on the [employer] portal if there are any changes to the plan described below, for example,if you are not contacted for the requested tests, referral(s) within one week, if you are unable to receive the medications prescribed, or if you feel you need to change the treatment course for any reason.     TESTING:  -- none at this time    REFERRALS:  -- physical therapy     PREVENTION (use daily regardless of headache):  -- continue Migravent supplement   -- continue venlafaxine to 150 mg daily   -- START propranolol once daily and can increase to twice daily if tolerating    AS-NEEDED TREATMENT (use total no more than 10 days per month unless otherwise stated):  -- ok to continue rizatriptan with next migraine. You can repeat two hours later if needed   -- stop Nurtec   -- START Ubrelvy with next migraine. You can repeat two hours later if needed. With this medication do not drink grapefruit juice or eat grapefruit or some medications like ketoconazole, itraconazole, or antibiotics clarithromycin   -- continue tizanidine at night. This is a muscle relaxer and it will also make you potentially sleepy. Start with half a tablet to see how you respond but can take up to a whole tablet if needed        Follow up in about 10 weeks (around 1/14/2025).      Irma Moe,  NP

## 2024-11-07 ENCOUNTER — TELEPHONE (OUTPATIENT)
Dept: NEUROLOGY | Facility: CLINIC | Age: 34
End: 2024-11-07
Payer: COMMERCIAL

## 2024-11-07 NOTE — TELEPHONE ENCOUNTER
The prior authorization for Fiona Timmons's Ubrelvy prescription has been APPROVED FROM 11/7/24 TO 11/5/25 with copayment of $0.00.

## 2024-11-14 DIAGNOSIS — R11.0 NAUSEA: ICD-10-CM

## 2024-11-15 ENCOUNTER — TELEPHONE (OUTPATIENT)
Dept: NEUROLOGY | Facility: CLINIC | Age: 34
End: 2024-11-15
Payer: COMMERCIAL

## 2024-11-15 RX ORDER — ONDANSETRON 4 MG/1
4 TABLET, ORALLY DISINTEGRATING ORAL EVERY 6 HOURS PRN
Qty: 30 TABLET | Refills: 11 | Status: SHIPPED | OUTPATIENT
Start: 2024-11-15

## 2024-11-15 NOTE — TELEPHONE ENCOUNTER
"----- Message from Elizabeth sent at 11/15/2024  2:35 PM CST -----  Regarding: NP PHYSICAL THERAPY JESICA Lee Morning/Afternoon,    The physical therapy department received your order to get the attached patient scheduled for an evaluation. We have reached out to the patient to schedule them for an evaluation; however, we have been unsuccessful in reaching the patient.      We wanted you to be aware that your patient has not started therapy. If you speak with them again about starting therapy please have them reach out to us at [phone] to get scheduled?.      Sincerely,  Elizabeth "Ms. Montemayor" Rad  Access Navigator  [phone] FAX: [phone]  [email or web address]  "

## 2025-05-20 DIAGNOSIS — G43.109 MIGRAINE WITH AURA AND WITHOUT STATUS MIGRAINOSUS, NOT INTRACTABLE: ICD-10-CM

## 2025-05-20 RX ORDER — RIZATRIPTAN BENZOATE 10 MG/1
TABLET ORAL
Qty: 10 TABLET | Refills: 11 | Status: SHIPPED | OUTPATIENT
Start: 2025-05-20

## 2025-06-24 DIAGNOSIS — G43.109 MIGRAINE WITH AURA AND WITHOUT STATUS MIGRAINOSUS, NOT INTRACTABLE: ICD-10-CM

## 2025-06-24 RX ORDER — VENLAFAXINE HYDROCHLORIDE 150 MG/1
150 CAPSULE, EXTENDED RELEASE ORAL DAILY
Qty: 30 CAPSULE | Refills: 11 | Status: SHIPPED | OUTPATIENT
Start: 2025-06-24 | End: 2026-06-24